# Patient Record
Sex: FEMALE | Race: WHITE | NOT HISPANIC OR LATINO | Employment: OTHER | URBAN - METROPOLITAN AREA
[De-identification: names, ages, dates, MRNs, and addresses within clinical notes are randomized per-mention and may not be internally consistent; named-entity substitution may affect disease eponyms.]

---

## 2018-08-31 ENCOUNTER — OFFICE VISIT CONVERTED (OUTPATIENT)
Dept: PULMONOLOGY | Facility: CLINIC | Age: 58
End: 2018-08-31
Attending: INTERNAL MEDICINE

## 2021-03-17 ENCOUNTER — HOSPITAL ENCOUNTER (OUTPATIENT)
Dept: VACCINE CLINIC | Facility: HOSPITAL | Age: 61
Discharge: HOME | End: 2021-03-17
Attending: INTERNAL MEDICINE

## 2021-04-07 ENCOUNTER — HOSPITAL ENCOUNTER (OUTPATIENT)
Dept: VACCINE CLINIC | Facility: HOSPITAL | Age: 61
Discharge: HOME | End: 2021-04-07
Attending: INTERNAL MEDICINE

## 2021-05-28 VITALS
OXYGEN SATURATION: 98 % | HEIGHT: 60 IN | WEIGHT: 198.56 LBS | SYSTOLIC BLOOD PRESSURE: 131 MMHG | BODY MASS INDEX: 38.98 KG/M2 | DIASTOLIC BLOOD PRESSURE: 73 MMHG | HEART RATE: 75 BPM | TEMPERATURE: 97.9 F | RESPIRATION RATE: 10 BRPM

## 2021-05-28 NOTE — PROGRESS NOTES
Patient: BE PLASCENCIA     Acct: [id number]     Report: #[id number]  UNIT #: [id number]     : 1960    Encounter Date:2018  PRIMARY CARE: DEJA Swartz  ***Signed***  --------------------------------------------------------------------------------------------------------------------  Chief Complaint      Encounter Date      Aug 31, 2018            Primary Care Provider            DEJA SWARTZ            Referring Provider            [facility] D/[facility]            Patient Complaint      Patient is complaining of      [facility] d/c f/u PE            VITALS      Height 5 ft 0 in / 152.4 cm      Weight 198 lbs 9 oz / 90.026627 kg      BSA 2.00 m2      BMI 38.8 kg/m2      Temperature 97.9 F / 36.61 C - Oral      Pulse 75      Respirations 10      Blood Pressure 131/73 Sitting, Left Arm      Pulse Oximetry 98%, ROOMAIR      Exhaled Nitrous Oxide Testin            HPI      The patient is a very pleasant 58 year old obese  female former     cigarette smoker with emphysema here to establish care.  In January she was     hospitalized with MRSA and strep pneumonia with pneumococcal bacteremia.  About     2-3 weeks ago she was hospitalized with hypoxic respiratory failure, shortness     of breath, cough and wheeze.  Chest x-ray showed granulomatous disease and     multifocal lung infiltrates.  All cultures were negative.  She completed a     course of ceftriaxone and azithromycin and was discharged home with albuterol.      During that time she was noted to have nocturnal desaturations and was told she     needs a sleep study. She feels much better today, but she uses albuterol 2-3     times a day with significant improvement in symptoms.  She has dyspnea on     exertion, worse with exertion, relieved with rest and moderate in severity.      Worse with walking about 7-800 feet. She has associated dry hacking cough, no     sputum production or hemoptysis. She also has wheezing on exertion.  Albuterol      completely resolves these symptoms, but then they occur 2-3 hours later.  She     has been told maybe she has COPD. She denies any weight gain, orthopnea,     paroxysmals nocturnal dyspnea, nausea, vomiting, fevers, chills or headaches.     She does snore at night and has trouble falling asleep at night and also has     trouble staying awake after meals or while watching TV and has a high [place]     sleepiness scale and is asking about a sleep study.  She needs to be vaccinated     for flu and pneumonia. She smoked for about 35 pack years and quit smoking about    15 years ago.            I have personally reviewed the review of systems, past family, social, surgical     and medical histories and I agree with the findings.            ROS      Constitutional:  Denies: Fatigue, Fever, Weight gain, Weight loss, Chills,     Insomnia, Other      Respiratory/Breathing:  Complains of: Shortness of air, Wheezing, Cough; Denies:    Hemoptysis, Pleuritic pain, Other      Endocrine:  Denies: Polydipsia, Polyuria, Heat/cold intolerance, Abnorml     menstrual pattern, Diabetes, Other      Eyes:  Denies: Blurred vision, Vision Changes, Other      Ears, nose, mouth, throat:  Denies: Mouth lesions, Thrush, Throat pain,     Hoarseness, Allergies/Hay Fever, Post Nasal Drip, Headaches, Recent Head Injury,    Nose Bleeding, Neck Stiffness, Thyroid Mass, Hearing Loss, Ear Fullness, Dry     Mouth, Nasal or Sinus Pain, Dry Lips, Nasal discharge, Nasal congestion, Other      Cardiovascular:  Denies: Palpitations, Syncope, Claudication, Chest Pain, Wake     up Gasping for air, Leg Swelling, Irregular Heart Rate, Cyanosis, Dyspnea on     Exertion, Other      Gastrointestinal:  Denies: Nausea, Constipation, Diarrhea, Abdominal pain,     Vomiting, Difficulty Swallowing, Reflux/Heartburn, Dysphagia, Jaundice,     Bloating, Melena, Bloody stools, Other      Genitourinary:  Denies: Urinary frequency, Incontinence, Hematuria, Urgency,      Nocturia, Dysuria, Testicular problems, Other      Musculoskeletal:  Denies: Joint Pain, Joint Stiffness, Joint Swelling, Myalgias,    Other      Hematologic/lymphatic:  DENIES: Lymphadenopathy, Bruising, Bleeding tendencies,     Other      Neurological:  Denies: Headache, Numbness, Weakness, Seizures, Other      Psychiatric:  Denies: Anxiety, Appropriate Effect, Depression, Other      Sleep:  No: Excessive daytime sleep, Morning Headache?, Snoring, Insomnia?, Stop    breathing at sleep?, Other      Integumentary:  Denies: Rash, Dry skin, Skin Warm to Touch, Other      Immunologic/Allergic:  Denies: Latex allergy, Seasonal allergies, Asthma,     Urticaria, Eczema, Other      Immunization status:  No: Up to date            FAMILY/SOCIAL/MEDICAL HX      Surgical History:  Yes: Abdominal Surgery (hernia), Appendectomy,     Cholecystectomy; No: Bladder Surgery, Bowel Surgery, CABG, Head Surgery, Oral     Surgery, Orthopedic Surgery, Vascular Surgery      Stroke - Family Hx:  Grandparent      Diabetes - Family Hx:  Grandparent      Cancer/Type - Family Hx:  Sister      Is Father Still Living?:  Yes      Is Mother Still Living?:  No       Family History:  Yes      Social History:  No Tobacco Use, No Alcohol Use, No Recreational Drug use      Smoking status:  Former smoker (2 PPD X 30 Y QUIT 7/2009)      Anticoagulation Therapy:  Yes      Antibiotic Prophylaxis:  No      Medical History:  Yes: Arthritis (left hip), Heart Attack (2013),     Hemorrhoids/Rectal Prob (IBS has chronic diarrhea cbrn), High Blood Pressure;     No: Asthma, Blood Disease, Chemotherapy/Cancer, Chronic Bronchitis/COPD,     Congestive Heart Failu, Deafness or Ringing Ears, Diabetes, Seizures, Shortness     Of Breath, Miscellaneous Medical/oth      Psychiatric History      NONE            PREVENTION      Hx Influenza Vaccination:  Yes (10/2017)      Date Influenza Vaccine Given:  Sep 1, 2017      Influenza Vaccine Declined:  No      2 or More Falls  Past Year?:  No      Fall Past Year with Injury?:  No      Hx Pneumococcal Vaccination:  Yes      Encouraged to follow-up with:  PCP regarding preventative exams.      Chart initiated by      VELMA GAMBLE            ALLERGIES/MEDICATIONS      Allergies:        Coded Allergies:             DULOXETINE (Verified  Allergy, Intermediate, rash, 8/31/18)           NSAIDS (NON-STEROIDAL ANTI-INFLAMMA (Verified  Adverse Reaction, Mild,     upset stomach, 8/31/18)      Uncoded Allergies:             nebutal (Adverse Reaction, Mild, DIARRHEA, 8/19/18)      Medications    Last Reconciled on 8/31/18 14:20 by JOSE NULL MD      Budesonide/Formoterol Fumarate (Symbicort 160/4.5 Mcg) 10.2 Gm Inh      2 PUFF INH BID, #1 INH 5 Refills         Prov: JOSE NULL         8/31/18       MDI-Albuterol (Ventolin HFA*) 18 Gm Hfa.aer.ad      1 PUFFS INH Q6H PRN for SHORTNESS OF BREATH, #1 MDI 0 Refills         Prov: Arianna Godinez         8/23/18       Aspirin EC (Aspirin EC*) 81 Mg Tablet.dr      81 MG PO QDAY for 30 Days, #30 TAB.SR         Prov: Arianna Godinez         8/23/18       Atorvastatin (Atorvastatin) 20 Mg Tablet      20 MG PO HS for 30 Days, #30 TAB         Prov: Arianna Godinez         8/23/18       Cyclobenzaprine Hcl (Cyclobenzaprine*) 10 Mg Tablet      10 MG PO Q8H PRN for MUSCLE SPASMS for 10 Days, #30 TAB         Prov: Arianna Godinez         8/23/18       Ranitidine HCl (Ranitidine*) 300 Mg Tablet      300 MG PO QDAY, #30 TAB         Reported         1/18/18       Metoprolol Succinate (Toprol XL*) 50 Mg Tabcr      100 MG PO QDAY, #30 TAB 0 Refills         Reported         5/8/16       Paroxetine Hcl (Paxil*) 40 Mg Tablet      40 MG PO QDAY, TAB         Reported         1/19/16       Benazepril/HCTZ 20/12.5 MG (Benazepril/HCTZ 20/12.5 Mg) 1 Tab Tablet      1 TAB PO QDAY, #60 TAB         Reported         1/19/16      Current Medications      Current Medications Reviewed 8/31/18            EXAM      Vital Signs  Reviewed.      General:  WDWN, Alert, NAD.      HEENT: PERRL, EOMI.  OP, nares clear, no sinus tenderness.      Neck: Supple, no JVD, no thyromegaly.      Lymph: No axillary, cervical, supraclavicular lymphadenopathy noted bilaterally.      Chest: Good aeration, clear to auscultation bilaterally, tympanic to percussion     bilaterally, no work of breathing noted.      CV: RRR, no MGR, pulses 2+, equal.        Abd: Obese, soft, NT, ND, +BS, no HSM.      EXT: No clubbing, no cyanosis, no edema, no joint tenderness.        Neuro:  A  Skin: No rashes or lesions.      Vtials      Vitals:             Height 5 ft 0 in / 152.4 cm           Weight 198 lbs 9 oz / 90.361621 kg           BSA 2.00 m2           BMI 38.8 kg/m2           Temperature 97.9 F / 36.61 C - Oral           Pulse 75           Respirations 10           Blood Pressure 131/73 Sitting, Left Arm           Pulse Oximetry 98%, ROOMAIR            REVIEW      Results Reviewed      PCCS Results Reviewed?:  Yes Prev Lab Results, Yes Prev Radiology Results, Yes     [facility]      Lab Results      I personally reviewed office notes from referring provider. I personally     reviewed labs including a CBC from 08/2018 showing a white cough and no     peripheral eosinophilia.  I also personally reviewed renal panel showing an     elevated serum bicarbonate, however I did review blood gas from 08/2018 showing     no evidence of chronic hypercapnic respiratory failure, but acute hypoxemic     respiratory failure as it read 7.46/40/63/28 on 30% FIO2.  Microbiology from     that admission was reviewed and unremarkable. I reviewed a chest x-ray and a     chest CT both from 08/2018.  I also reviewed echocardiogram from 01/2018 showing    evidence of diastolic dysfunction.      Radiographic Results               [facility]                PACS RADIOLOGY REPORT            Patient: BE PLASCENCIA   Acct:  #[id number]   Report: #[id number]            UNIT #: [id number]    DOS: 18 1630      INSURANCE:BLUE CROSS - [employer]   ORDER #:[id number]      LOCATION:[facility]  [address]   : 1960            PROVIDERS      ADMITTING:  Arianna Godinez   ATTENDING: Arianna Godinez      FAMILY:  DEJA Soliman   ORDERING:  Arianna Godinez         OTHER:    DICTATING:  Reuben Rosenberg MD, IV            REQ #:[id number]   EXAM:[facility] - CT CHEST with CONTRAST      REASON FOR EXAM:  chest pain, rule out PE      REASON FOR VISIT:  COPD            *******Signed******         PROCEDURE:   CT CHEST W/ CONTRAST             COMPARISON:   [facility], CT, CHEST W/ CONTRAST, 2018, 20:07.             INDICATIONS:   CHEST PAIN, SHORTNESS OF AIR             PROTOCOL:     Pulmonary embolism imaging protocol performed                RADIATION:     DLP: 594mGy*cm          Automated exposure control was utilized to minimize radiation dose.              CONTRAST:   75cc Isovue 370 I.V.             LABS:     eGFR: >60ml/min/1.73m2             TECHNIQUE:   Axial images of the chest with intravenous contrast.             FINDINGS:      No pulmonary emboli are identified.  Mild Coronary artery calcification is pr    esent.  The thoracic       aorta has a normal caliber.  No evidence of pneumothorax or pleural effusion.      There are mild       patchy areas of ground-glass opacity in the lung fields.  No discrete masses are    identified.  There       are no enlarged mediastinal, axillary or hilar lymph nodes.  The patient is     status post       cholecystectomy.  There is mild aortic valvular calcification.             IMPRESSION:              1.  No pulmonary emboli are identified.             2.  Mild coronary artery calcification and aortic valvular calcification.             3.  Mild patchy areas of of ground-glass opacity in the lung fields.  This may     be secondary to mild       edema or nonspecific pneumonitis.              REUBEN  CURLY CORONADO MD             Electronically Signed and Approved By: MAHNAZ CORONADO MD on 2018 at 20:13                           Until signed, this is an unconfirmed preliminary report that may contain      errors and is subject to change.                                              CODIE:      D:18               [facility]                PACS RADIOLOGY REPORT            Patient: BE PLASCENCIA   Acct: #[id number]   Report: #[id number]            UNIT #: [id number]    DOS: 18 0935      INSURANCE:[facility]   ORDER #:[id number]      LOCATION:[facility]  [address]   : 1960            PROVIDERS      ADMITTING:  Arianna Godinez   ATTENDING: Arianna Godinez      FAMILY:  DEJA Soliman   ORDERING:  Arianna Godinez         OTHER:    DICTATING:  VERNON HONEYCUTT MD            REQ #:[id number]   EXAM:CXRPORTABL - Portable Chest xray 1 view      REASON FOR EXAM:  CHEST PAIN      REASON FOR VISIT:  COPD            *******Signed******         PROCEDURE:   PORTABLE CHEST X-RAY             COMPARISON:   [facility], CR, CHEST AP/PA 1 VIEW, 2018,     19:24.             INDICATIONS:   CHEST PAIN             FINDINGS:         Stable mild cardiomegaly.  No dense consolidation, pleural fluid, or     pneumothorax.             CONCLUSION:   Stable cardiomegaly.  No active process              VERNON HONEYCUTT MD             Electronically Signed and Approved By: VERNON HONEYCUTT MD on 2018 at 10:15                        Until signed, this is an unconfirmed preliminary report that may contain      errors and is subject to change.                                              SARA:      D:18 1015            Assessment      Obesity (BMI 30-39.9) - E66.9            Snoring - R06.83            Excessive daytime sleepiness - G47.19            SEN (dyspnea on exertion) - R06.09            Cough - R05            Ground glass opacity present  on imaging of lung - R91.8            Notes      New Medications      * Budesonide/Formoterol Fumarate (Symbicort 160/4.5 Mcg) 10.2 GM INH: 2 PUFF INH      BID #1         Dx: Cough - R05      New Diagnostics      * Home Sleep Study, Week         Dx: Obesity (BMI 30-39.9) - E66.9      * 6 Min Walk With Pulse Ox, Routine         Dx: Obesity (BMI 30-39.9) - E66.9      * PFT-Comp, PrePost,DLCO,BodyBox, Week         Dx: Obesity (BMI 30-39.9) - E66.9      * Alpha 1 Antitrypsin , Month         Dx: Obesity (BMI 30-39.9) - E66.9      * Chest W/O Cont CT, 2 Months         Dx: Obesity (BMI 30-39.9) - E66.9      New Office Procedures      * Prevnar 0.5 PCV13, As Soon As Possible         Pneumoc 13-Gisela Conj-Dip CRm/Pf (Prevnar 13 Syringe) 0.5 ML SYRINGE: 0.5        MILLILITER INTRAMUSCULARLY Qty 1 SYRINGE         Dx: Cough - R05      IMPRESSION:      1.  Acute hypoxemic respiratory failure, resolved.      2. Community acquired pneumonia from unspecified organism, resolved.      3.  Normal chest CT with multifocal ground glass infiltrates, needs to be     followed up to ensure resolution.      4. Dyspnea on exertion at baseline.      5. Cough.      6. Obesity with BMI of 38.8.      7. Emphysema.  Needs further workup for COPD and would benefit from controller     inhaler medications given persistent symptoms at baseline.      8.  Snoring.      9.  Excessive daytime sleepiness.      10. Tobacco abuse with cigarettes in remission.               PLAN:      1.  I performed exhale nitric oxide testing in the office today.  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       2.  We will check noncontrast chest CT in two months to assess for resolution of    multifocal lung infiltrates and ground glass infiltrates.      3. Check alpha 1 antitrypsin level and genotype.      4. Check PFTs and six minute walk test to assess for airflow obstruction and     bronchodilator response.      5. Check home  sleep study and start autotitrating CPAP as she does have sleep     apnea.      6.  Start Symbicort 160/4.5 two puffs twice a day. Continue albuterol as needed.      7.  She needs to be vaccinated for flu and pneumonia.  We will do Prevnar today     and Pneumovax in 08/2019.  She needs flu vaccine at her next follow up visit.      8.  Diet and exercise counseling provided today.  I spent 4 minutes discussing     30-60 minutes of daily exercise and a 0288-5515 calorie a day diet.  She     verbalized understanding.        9.  We will have patient follow up in 2-3 months to reassess symptoms and     discuss test results.            Patient Education      ACO BMI High above 25:  Counseling Given, Encouraged weight loss, Encourage     dietary changes      Patient Education Provided:  How to use an Inhaler, Sleep Apnea            Patient Education:        Emphysema                 Disclaimer: Converted document may not contain table formatting or lab diagrams. Please see [employer] [facility] for the authenticated document.

## 2021-08-30 ENCOUNTER — APPOINTMENT (OUTPATIENT)
Dept: GENERAL RADIOLOGY | Facility: HOSPITAL | Age: 61
End: 2021-08-30

## 2021-08-30 ENCOUNTER — HOSPITAL ENCOUNTER (EMERGENCY)
Facility: HOSPITAL | Age: 61
Discharge: HOME | End: 2021-08-31
Attending: EMERGENCY MEDICINE | Admitting: EMERGENCY MEDICINE

## 2021-08-30 DIAGNOSIS — R19.7 DIARRHEA, UNSPECIFIED TYPE: ICD-10-CM

## 2021-08-30 DIAGNOSIS — R06.00 DYSPNEA, UNSPECIFIED TYPE: Primary | ICD-10-CM

## 2021-08-30 LAB
ALBUMIN SERPL-MCNC: 4.1 G/DL (ref 3.5–5.2)
ALBUMIN/GLOB SERPL: 1.3 G/DL
ALP SERPL-CCNC: 143 U/L (ref 39–117)
ALT SERPL W P-5'-P-CCNC: 25 U/L (ref 1–33)
ANION GAP SERPL CALCULATED.3IONS-SCNC: 14.8 MMOL/L (ref 5–15)
AST SERPL-CCNC: 18 U/L (ref 1–32)
BASOPHILS # BLD AUTO: 0.06 10*3/MM3 (ref 0–0.2)
BASOPHILS NFR BLD AUTO: 0.8 % (ref 0–1.5)
BILIRUB SERPL-MCNC: 0.7 MG/DL (ref 0–1.2)
BUN SERPL-MCNC: 15 MG/DL (ref 8–23)
BUN/CREAT SERPL: 19.2 (ref 7–25)
CALCIUM SPEC-SCNC: 9.3 MG/DL (ref 8.6–10.5)
CHLORIDE SERPL-SCNC: 97 MMOL/L (ref 98–107)
CO2 SERPL-SCNC: 25.2 MMOL/L (ref 22–29)
CREAT SERPL-MCNC: 0.78 MG/DL (ref 0.57–1)
DEPRECATED RDW RBC AUTO: 44.3 FL (ref 37–54)
EOSINOPHIL # BLD AUTO: 0.07 10*3/MM3 (ref 0–0.4)
EOSINOPHIL NFR BLD AUTO: 0.9 % (ref 0.3–6.2)
ERYTHROCYTE [DISTWIDTH] IN BLOOD BY AUTOMATED COUNT: 14.6 % (ref 12.3–15.4)
GFR SERPL CREATININE-BSD FRML MDRD: 75 ML/MIN/1.73
GLOBULIN UR ELPH-MCNC: 3.1 GM/DL
GLUCOSE SERPL-MCNC: 149 MG/DL (ref 65–99)
HCT VFR BLD AUTO: 46 % (ref 34–46.6)
HGB BLD-MCNC: 15.8 G/DL (ref 12–15.9)
HOLD SPECIMEN: NORMAL
HOLD SPECIMEN: NORMAL
IMM GRANULOCYTES # BLD AUTO: 0.02 10*3/MM3 (ref 0–0.05)
IMM GRANULOCYTES NFR BLD AUTO: 0.3 % (ref 0–0.5)
LYMPHOCYTES # BLD AUTO: 1.58 10*3/MM3 (ref 0.7–3.1)
LYMPHOCYTES NFR BLD AUTO: 21.2 % (ref 19.6–45.3)
MAGNESIUM SERPL-MCNC: 1.7 MG/DL (ref 1.6–2.4)
MCH RBC QN AUTO: 29.2 PG (ref 26.6–33)
MCHC RBC AUTO-ENTMCNC: 34.3 G/DL (ref 31.5–35.7)
MCV RBC AUTO: 84.9 FL (ref 79–97)
MONOCYTES # BLD AUTO: 0.4 10*3/MM3 (ref 0.1–0.9)
MONOCYTES NFR BLD AUTO: 5.4 % (ref 5–12)
NEUTROPHILS NFR BLD AUTO: 5.32 10*3/MM3 (ref 1.7–7)
NEUTROPHILS NFR BLD AUTO: 71.4 % (ref 42.7–76)
NRBC BLD AUTO-RTO: 0 /100 WBC (ref 0–0.2)
NT-PROBNP SERPL-MCNC: 589 PG/ML (ref 0–900)
PLATELET # BLD AUTO: 200 10*3/MM3 (ref 140–450)
PMV BLD AUTO: 9.9 FL (ref 6–12)
POTASSIUM SERPL-SCNC: 3.4 MMOL/L (ref 3.5–5.2)
PROT SERPL-MCNC: 7.2 G/DL (ref 6–8.5)
RBC # BLD AUTO: 5.42 10*6/MM3 (ref 3.77–5.28)
SODIUM SERPL-SCNC: 137 MMOL/L (ref 136–145)
TROPONIN T SERPL-MCNC: <0.01 NG/ML (ref 0–0.03)
WBC # BLD AUTO: 7.45 10*3/MM3 (ref 3.4–10.8)
WHOLE BLOOD HOLD SPECIMEN: NORMAL
WHOLE BLOOD HOLD SPECIMEN: NORMAL

## 2021-08-30 PROCEDURE — 93010 ELECTROCARDIOGRAM REPORT: CPT | Performed by: INTERNAL MEDICINE

## 2021-08-30 PROCEDURE — U0003 INFECTIOUS AGENT DETECTION BY NUCLEIC ACID (DNA OR RNA); SEVERE ACUTE RESPIRATORY SYNDROME CORONAVIRUS 2 (SARS-COV-2) (CORONAVIRUS DISEASE [COVID-19]), AMPLIFIED PROBE TECHNIQUE, MAKING USE OF HIGH THROUGHPUT TECHNOLOGIES AS DESCRIBED BY CMS-2020-01-R: HCPCS | Performed by: NURSE PRACTITIONER

## 2021-08-30 PROCEDURE — 85025 COMPLETE CBC W/AUTO DIFF WBC: CPT | Performed by: EMERGENCY MEDICINE

## 2021-08-30 PROCEDURE — 93005 ELECTROCARDIOGRAM TRACING: CPT | Performed by: EMERGENCY MEDICINE

## 2021-08-30 PROCEDURE — 96374 THER/PROPH/DIAG INJ IV PUSH: CPT

## 2021-08-30 PROCEDURE — 25010000002 ONDANSETRON PER 1 MG: Performed by: EMERGENCY MEDICINE

## 2021-08-30 PROCEDURE — 83735 ASSAY OF MAGNESIUM: CPT | Performed by: EMERGENCY MEDICINE

## 2021-08-30 PROCEDURE — 71045 X-RAY EXAM CHEST 1 VIEW: CPT

## 2021-08-30 PROCEDURE — 84484 ASSAY OF TROPONIN QUANT: CPT | Performed by: EMERGENCY MEDICINE

## 2021-08-30 PROCEDURE — 93005 ELECTROCARDIOGRAM TRACING: CPT

## 2021-08-30 PROCEDURE — 80053 COMPREHEN METABOLIC PANEL: CPT | Performed by: EMERGENCY MEDICINE

## 2021-08-30 PROCEDURE — 99283 EMERGENCY DEPT VISIT LOW MDM: CPT

## 2021-08-30 PROCEDURE — 83880 ASSAY OF NATRIURETIC PEPTIDE: CPT | Performed by: EMERGENCY MEDICINE

## 2021-08-30 RX ORDER — ONDANSETRON 2 MG/ML
4 INJECTION INTRAMUSCULAR; INTRAVENOUS ONCE
Status: COMPLETED | OUTPATIENT
Start: 2021-08-30 | End: 2021-08-30

## 2021-08-30 RX ORDER — IPRATROPIUM BROMIDE AND ALBUTEROL SULFATE 2.5; .5 MG/3ML; MG/3ML
3 SOLUTION RESPIRATORY (INHALATION) ONCE
Status: DISCONTINUED | OUTPATIENT
Start: 2021-08-30 | End: 2021-08-31 | Stop reason: HOSPADM

## 2021-08-30 RX ORDER — SODIUM CHLORIDE 0.9 % (FLUSH) 0.9 %
10 SYRINGE (ML) INJECTION AS NEEDED
Status: DISCONTINUED | OUTPATIENT
Start: 2021-08-30 | End: 2021-08-31 | Stop reason: HOSPADM

## 2021-08-30 RX ADMIN — SODIUM CHLORIDE 1000 ML: 9 INJECTION, SOLUTION INTRAVENOUS at 23:08

## 2021-08-30 RX ADMIN — ONDANSETRON 4 MG: 2 INJECTION INTRAMUSCULAR; INTRAVENOUS at 23:07

## 2021-08-31 VITALS
WEIGHT: 212.3 LBS | SYSTOLIC BLOOD PRESSURE: 140 MMHG | RESPIRATION RATE: 20 BRPM | HEART RATE: 72 BPM | OXYGEN SATURATION: 96 % | TEMPERATURE: 97.9 F | HEIGHT: 61 IN | BODY MASS INDEX: 40.08 KG/M2 | DIASTOLIC BLOOD PRESSURE: 75 MMHG

## 2021-08-31 LAB — SARS-COV-2 RNA RESP QL NAA+PROBE: NOT DETECTED

## 2021-08-31 RX ORDER — ONDANSETRON 4 MG/1
4 TABLET, ORALLY DISINTEGRATING ORAL EVERY 8 HOURS PRN
Qty: 12 TABLET | Refills: 0 | Status: SHIPPED | OUTPATIENT
Start: 2021-08-31

## 2021-09-07 LAB — QT INTERVAL: 394 MS

## 2022-04-19 ENCOUNTER — APPOINTMENT (OUTPATIENT)
Dept: GENERAL RADIOLOGY | Facility: HOSPITAL | Age: 62
End: 2022-04-19

## 2022-04-19 ENCOUNTER — HOSPITAL ENCOUNTER (EMERGENCY)
Facility: HOSPITAL | Age: 62
Discharge: HOME | End: 2022-04-19
Attending: EMERGENCY MEDICINE | Admitting: EMERGENCY MEDICINE

## 2022-04-19 VITALS
SYSTOLIC BLOOD PRESSURE: 113 MMHG | HEIGHT: 61 IN | DIASTOLIC BLOOD PRESSURE: 52 MMHG | HEART RATE: 57 BPM | BODY MASS INDEX: 40.11 KG/M2 | OXYGEN SATURATION: 95 % | TEMPERATURE: 99.4 F | RESPIRATION RATE: 16 BRPM
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
## 2022-04-19 DIAGNOSIS — M54.9 BACK PAIN, UNSPECIFIED BACK LOCATION, UNSPECIFIED BACK PAIN LATERALITY, UNSPECIFIED CHRONICITY: ICD-10-CM

## 2022-04-19 PROCEDURE — 96372 THER/PROPH/DIAG INJ SC/IM: CPT

## 2022-04-19 PROCEDURE — 25010000002 KETOROLAC TROMETHAMINE PER 15 MG: Performed by: EMERGENCY MEDICINE

## 2022-04-19 PROCEDURE — 96374 THER/PROPH/DIAG INJ IV PUSH: CPT

## 2022-04-19 PROCEDURE — 99283 EMERGENCY DEPT VISIT LOW MDM: CPT

## 2022-04-19 PROCEDURE — 72100 X-RAY EXAM L-S SPINE 2/3 VWS: CPT

## 2022-04-19 PROCEDURE — 71045 X-RAY EXAM CHEST 1 VIEW: CPT

## 2022-04-19 PROCEDURE — 25010000002 ORPHENADRINE CITRATE PER 60 MG: Performed by: EMERGENCY MEDICINE

## 2022-04-19 RX ORDER — KETOROLAC TROMETHAMINE 15 MG/ML
15 INJECTION, SOLUTION INTRAMUSCULAR; INTRAVENOUS ONCE
Status: COMPLETED | OUTPATIENT
Start: 2022-04-19 | End: 2022-04-19

## 2022-04-19 RX ORDER — KETOROLAC TROMETHAMINE 10 MG/1
10 TABLET, FILM COATED ORAL EVERY 6 HOURS PRN
Qty: 15 TABLET | Refills: 0 | Status: SHIPPED | OUTPATIENT
Start: 2022-04-19

## 2022-04-19 RX ORDER — ORPHENADRINE CITRATE 30 MG/ML
60 INJECTION INTRAMUSCULAR; INTRAVENOUS ONCE
Status: COMPLETED | OUTPATIENT
Start: 2022-04-19 | End: 2022-04-19

## 2022-04-19 RX ORDER — OXYCODONE HYDROCHLORIDE AND ACETAMINOPHEN 5; 325 MG/1; MG/1
1 TABLET ORAL ONCE
Status: COMPLETED | OUTPATIENT
Start: 2022-04-19 | End: 2022-04-19

## 2022-04-19 RX ORDER — CYCLOBENZAPRINE HCL 10 MG
10 TABLET ORAL 3 TIMES DAILY
Qty: 20 TABLET | Refills: 0 | Status: SHIPPED | OUTPATIENT
Start: 2022-04-19

## 2022-04-19 RX ADMIN — OXYCODONE HYDROCHLORIDE AND ACETAMINOPHEN 1 TABLET: 5; 325 TABLET ORAL at 20:39

## 2022-04-19 RX ADMIN — KETOROLAC TROMETHAMINE 15 MG: 15 INJECTION, SOLUTION INTRAMUSCULAR; INTRAVENOUS at 20:40

## 2022-04-19 RX ADMIN — ORPHENADRINE CITRATE 60 MG: 60 INJECTION INTRAMUSCULAR; INTRAVENOUS at 20:41

## 2023-05-30 ENCOUNTER — HOSPITAL ENCOUNTER (EMERGENCY)
Facility: HOSPITAL | Age: 63
Discharge: HOME | End: 2023-05-31
Attending: EMERGENCY MEDICINE | Admitting: EMERGENCY MEDICINE
Payer: MEDICARE

## 2023-05-30 ENCOUNTER — APPOINTMENT (OUTPATIENT)
Dept: GENERAL RADIOLOGY | Facility: HOSPITAL | Age: 63
End: 2023-05-30
Payer: MEDICARE

## 2023-05-30 VITALS
WEIGHT: 203.93 LBS | HEIGHT: 61 IN | DIASTOLIC BLOOD PRESSURE: 76 MMHG | SYSTOLIC BLOOD PRESSURE: 155 MMHG | TEMPERATURE: 98.5 F | BODY MASS INDEX: 38.5 KG/M2 | HEART RATE: 56 BPM | OXYGEN SATURATION: 93 % | RESPIRATION RATE: 18 BRPM

## 2023-05-30 DIAGNOSIS — I10 HYPERTENSION, UNSPECIFIED TYPE: Primary | ICD-10-CM

## 2023-05-30 DIAGNOSIS — R51.9 NONINTRACTABLE HEADACHE, UNSPECIFIED CHRONICITY PATTERN, UNSPECIFIED HEADACHE TYPE: ICD-10-CM

## 2023-05-30 LAB
ALBUMIN SERPL-MCNC: 4.1 G/DL (ref 3.5–5.2)
ALBUMIN/GLOB SERPL: 1.2 G/DL
ALP SERPL-CCNC: 118 U/L (ref 39–117)
ALT SERPL W P-5'-P-CCNC: 8 U/L (ref 1–33)
ANION GAP SERPL CALCULATED.3IONS-SCNC: 9.3 MMOL/L (ref 5–15)
AST SERPL-CCNC: 12 U/L (ref 1–32)
BACTERIA UR QL AUTO: ABNORMAL /HPF
BASOPHILS # BLD AUTO: 0.07 10*3/MM3 (ref 0–0.2)
BASOPHILS NFR BLD AUTO: 0.7 % (ref 0–1.5)
BILIRUB SERPL-MCNC: 0.4 MG/DL (ref 0–1.2)
BILIRUB UR QL STRIP: NEGATIVE
BUN SERPL-MCNC: 17 MG/DL (ref 8–23)
BUN/CREAT SERPL: 17.3 (ref 7–25)
CALCIUM SPEC-SCNC: 9.8 MG/DL (ref 8.6–10.5)
CHLORIDE SERPL-SCNC: 101 MMOL/L (ref 98–107)
CLARITY UR: ABNORMAL
CO2 SERPL-SCNC: 31.7 MMOL/L (ref 22–29)
COLOR UR: YELLOW
CREAT SERPL-MCNC: 0.98 MG/DL (ref 0.57–1)
DEPRECATED RDW RBC AUTO: 47.8 FL (ref 37–54)
EGFRCR SERPLBLD CKD-EPI 2021: 64.2 ML/MIN/1.73
EOSINOPHIL # BLD AUTO: 0.18 10*3/MM3 (ref 0–0.4)
EOSINOPHIL NFR BLD AUTO: 1.9 % (ref 0.3–6.2)
ERYTHROCYTE [DISTWIDTH] IN BLOOD BY AUTOMATED COUNT: 14.5 % (ref 12.3–15.4)
GLOBULIN UR ELPH-MCNC: 3.4 GM/DL
GLUCOSE SERPL-MCNC: 82 MG/DL (ref 65–99)
GLUCOSE UR STRIP-MCNC: NEGATIVE MG/DL
HCT VFR BLD AUTO: 45.9 % (ref 34–46.6)
HGB BLD-MCNC: 15.1 G/DL (ref 12–15.9)
HGB UR QL STRIP.AUTO: ABNORMAL
HOLD SPECIMEN: NORMAL
HOLD SPECIMEN: NORMAL
HYALINE CASTS UR QL AUTO: ABNORMAL /LPF
IMM GRANULOCYTES # BLD AUTO: 0.03 10*3/MM3 (ref 0–0.05)
IMM GRANULOCYTES NFR BLD AUTO: 0.3 % (ref 0–0.5)
KETONES UR QL STRIP: ABNORMAL
LEUKOCYTE ESTERASE UR QL STRIP.AUTO: ABNORMAL
LYMPHOCYTES # BLD AUTO: 2.5 10*3/MM3 (ref 0.7–3.1)
LYMPHOCYTES NFR BLD AUTO: 26 % (ref 19.6–45.3)
MCH RBC QN AUTO: 29.4 PG (ref 26.6–33)
MCHC RBC AUTO-ENTMCNC: 32.9 G/DL (ref 31.5–35.7)
MCV RBC AUTO: 89.3 FL (ref 79–97)
MONOCYTES # BLD AUTO: 0.46 10*3/MM3 (ref 0.1–0.9)
MONOCYTES NFR BLD AUTO: 4.8 % (ref 5–12)
NEUTROPHILS NFR BLD AUTO: 6.36 10*3/MM3 (ref 1.7–7)
NEUTROPHILS NFR BLD AUTO: 66.3 % (ref 42.7–76)
NITRITE UR QL STRIP: NEGATIVE
NRBC BLD AUTO-RTO: 0 /100 WBC (ref 0–0.2)
NT-PROBNP SERPL-MCNC: 1151 PG/ML (ref 0–900)
PH UR STRIP.AUTO: 5.5 [PH] (ref 5–8)
PLATELET # BLD AUTO: 240 10*3/MM3 (ref 140–450)
PMV BLD AUTO: 9.8 FL (ref 6–12)
POTASSIUM SERPL-SCNC: 3.9 MMOL/L (ref 3.5–5.2)
PROT SERPL-MCNC: 7.5 G/DL (ref 6–8.5)
PROT UR QL STRIP: ABNORMAL
RBC # BLD AUTO: 5.14 10*6/MM3 (ref 3.77–5.28)
RBC # UR STRIP: ABNORMAL /HPF
REF LAB TEST METHOD: ABNORMAL
SODIUM SERPL-SCNC: 142 MMOL/L (ref 136–145)
SP GR UR STRIP: 1.03 (ref 1–1.03)
SQUAMOUS #/AREA URNS HPF: ABNORMAL /HPF
TROPONIN T SERPL HS-MCNC: 10 NG/L
TROPONIN T SERPL HS-MCNC: 11 NG/L
UROBILINOGEN UR QL STRIP: ABNORMAL
WBC # UR STRIP: ABNORMAL /HPF
WBC NRBC COR # BLD: 9.6 10*3/MM3 (ref 3.4–10.8)
WHOLE BLOOD HOLD COAG: NORMAL
WHOLE BLOOD HOLD SPECIMEN: NORMAL

## 2023-05-30 PROCEDURE — 83880 ASSAY OF NATRIURETIC PEPTIDE: CPT | Performed by: EMERGENCY MEDICINE

## 2023-05-30 PROCEDURE — 85025 COMPLETE CBC W/AUTO DIFF WBC: CPT | Performed by: EMERGENCY MEDICINE

## 2023-05-30 PROCEDURE — 84484 ASSAY OF TROPONIN QUANT: CPT | Performed by: EMERGENCY MEDICINE

## 2023-05-30 PROCEDURE — 93005 ELECTROCARDIOGRAM TRACING: CPT | Performed by: EMERGENCY MEDICINE

## 2023-05-30 PROCEDURE — 93005 ELECTROCARDIOGRAM TRACING: CPT

## 2023-05-30 PROCEDURE — 71045 X-RAY EXAM CHEST 1 VIEW: CPT

## 2023-05-30 PROCEDURE — 81001 URINALYSIS AUTO W/SCOPE: CPT

## 2023-05-30 PROCEDURE — 87086 URINE CULTURE/COLONY COUNT: CPT | Performed by: EMERGENCY MEDICINE

## 2023-05-30 PROCEDURE — 80053 COMPREHEN METABOLIC PANEL: CPT | Performed by: EMERGENCY MEDICINE

## 2023-05-30 PROCEDURE — 99284 EMERGENCY DEPT VISIT MOD MDM: CPT

## 2023-05-30 PROCEDURE — 36415 COLL VENOUS BLD VENIPUNCTURE: CPT | Performed by: EMERGENCY MEDICINE

## 2023-05-30 RX ORDER — ATORVASTATIN CALCIUM 20 MG/1
TABLET, FILM COATED ORAL
COMMUNITY

## 2023-05-30 RX ORDER — PAROXETINE HYDROCHLORIDE 20 MG/1
20 TABLET, FILM COATED ORAL EVERY MORNING
COMMUNITY
Start: 2023-04-23

## 2023-05-30 RX ORDER — LOSARTAN POTASSIUM 100 MG/1
1 TABLET ORAL DAILY
COMMUNITY
Start: 2023-04-23

## 2023-05-30 RX ORDER — BUPRENORPHINE 7.5 UG/H
PATCH TRANSDERMAL
COMMUNITY
Start: 2023-05-25

## 2023-05-30 RX ORDER — SODIUM CHLORIDE 0.9 % (FLUSH) 0.9 %
10 SYRINGE (ML) INJECTION AS NEEDED
Status: DISCONTINUED | OUTPATIENT
Start: 2023-05-30 | End: 2023-05-31 | Stop reason: HOSPADM

## 2023-05-30 RX ORDER — ALBUTEROL SULFATE 90 UG/1
2 AEROSOL, METERED RESPIRATORY (INHALATION) EVERY 4 HOURS PRN
COMMUNITY
Start: 2023-05-02

## 2023-05-30 RX ORDER — BUTALBITAL, ACETAMINOPHEN AND CAFFEINE 300; 40; 50 MG/1; MG/1; MG/1
1 CAPSULE ORAL ONCE
Status: COMPLETED | OUTPATIENT
Start: 2023-05-31 | End: 2023-05-30

## 2023-05-30 RX ORDER — ASPIRIN 81 MG/1
TABLET ORAL
COMMUNITY

## 2023-05-30 RX ORDER — FLUTICASONE FUROATE AND VILANTEROL 200; 25 UG/1; UG/1
1 POWDER RESPIRATORY (INHALATION) DAILY
COMMUNITY
Start: 2023-05-03

## 2023-05-30 RX ORDER — TIOTROPIUM BROMIDE 18 UG/1
CAPSULE ORAL; RESPIRATORY (INHALATION)
COMMUNITY
Start: 2023-05-24

## 2023-05-30 RX ORDER — AMLODIPINE BESYLATE 5 MG/1
1 TABLET ORAL DAILY
COMMUNITY
Start: 2023-04-23

## 2023-05-30 RX ORDER — TRIAMCINOLONE ACETONIDE 5 MG/G
CREAM TOPICAL SEE ADMIN INSTRUCTIONS
COMMUNITY
Start: 2023-05-02

## 2023-05-30 RX ORDER — METOPROLOL SUCCINATE 100 MG/1
100 TABLET, EXTENDED RELEASE ORAL DAILY
COMMUNITY
Start: 2022-03-22

## 2023-05-30 RX ORDER — KETOROLAC TROMETHAMINE 15 MG/ML
15 INJECTION, SOLUTION INTRAMUSCULAR; INTRAVENOUS ONCE
Status: DISCONTINUED | OUTPATIENT
Start: 2023-05-31 | End: 2023-05-30

## 2023-05-30 RX ADMIN — BUTALBITA,ACETAMINOPHEN AND CAFFEINE 1 CAPSULE: 50; 300; 40 CAPSULE ORAL at 23:53

## 2023-05-30 NOTE — ED PROVIDER NOTES
Time: 4:56 PM EDT  Date of encounter:  5/30/2023  Independent Historian/Clinical History and Information was obtained by:   Patient  Chief Complaint   Patient presents with   • Shortness of Breath   • Headache   • Hypertension       History is limited by: N/A    History of Present Illness:  Patient is a 63 y.o. year old female who presents to the emergency department for evaluation of elevated blood pressure.  Patient reports that she has been struggling with volatile pressures since Friday.  She took an extra amlodipine this afternoon but it does not seem like it helped much.  Also complains of shortness of breath, and a headache as well as some nausea that she attributes to the headache. She denies chest pain.  No recent medication changes.  RUEL. GLORIA Bunch    Patent denies cough, vomiting. Patient states she has been compliant with all medications. Patient states headache was a 7/10 when she came into ED but is now 5/10. Patient states she has been eating and drinking normally. Patient denies any head injury. Patient states has family hx of blood clots in head (Mother).     History provided by:  Patient   used: No        Patient Care Team  Primary Care Provider: Niranjan Law MD    Past Medical History:     No Known Allergies  Past Medical History:   Diagnosis Date   • COPD (chronic obstructive pulmonary disease)    • Depression    • Hypertension    • Pneumonia    • Sciatic leg pain     left leg     Past Surgical History:   Procedure Laterality Date   • CHOLECYSTECTOMY     • HERNIA REPAIR     • TUBAL ABDOMINAL LIGATION       History reviewed. No pertinent family history.    Home Medications:  Prior to Admission medications    Medication Sig Start Date End Date Taking? Authorizing Provider   cyclobenzaprine (FLEXERIL) 10 MG tablet Take 1 tablet by mouth 3 (Three) Times a Day. 4/19/22   Estevan Deluna MD   ketorolac (TORADOL) 10 MG tablet Take 1 tablet by mouth Every 6 (Six) Hours As  "Needed for Moderate Pain . 4/19/22   Estevan Deluna MD   ondansetron ODT (ZOFRAN-ODT) 4 MG disintegrating tablet Place 1 tablet on the tongue Every 8 (Eight) Hours As Needed for Nausea or Vomiting. 8/31/21   Inderjit Harrington MD        Social History:   Social History     Tobacco Use   • Smoking status: Former     Years: 30.00     Types: Cigarettes     Quit date: 2013     Years since quitting: 10.4   Substance Use Topics   • Alcohol use: Never         Review of Systems:  Review of Systems   Constitutional: Negative for chills and fever.   HENT: Negative for congestion, rhinorrhea and sore throat.    Eyes: Negative for photophobia.   Respiratory: Positive for shortness of breath. Negative for apnea, cough and chest tightness.    Cardiovascular: Negative for chest pain and palpitations.   Gastrointestinal: Positive for nausea. Negative for abdominal pain, diarrhea and vomiting.   Endocrine: Negative.    Genitourinary: Negative for difficulty urinating and dysuria.   Musculoskeletal: Negative for back pain, joint swelling and myalgias.   Skin: Negative for color change and wound.   Allergic/Immunologic: Negative.    Neurological: Positive for headaches. Negative for seizures.   Psychiatric/Behavioral: Negative.    All other systems reviewed and are negative.       Physical Exam:  /76   Pulse 56   Temp 98.5 °F (36.9 °C) (Oral)   Resp 18   Ht 154.9 cm (61\")   Wt 92.5 kg (203 lb 14.8 oz)   SpO2 93%   BMI 38.53 kg/m²     Physical Exam  Constitutional:       Appearance: Normal appearance.   HENT:      Head: Normocephalic and atraumatic.      Nose: Nose normal.      Mouth/Throat:      Mouth: Mucous membranes are moist.   Eyes:      Extraocular Movements: Extraocular movements intact.      Conjunctiva/sclera: Conjunctivae normal.      Pupils: Pupils are equal, round, and reactive to light.   Cardiovascular:      Rate and Rhythm: Normal rate and regular rhythm.      Pulses: Normal pulses.      Heart " sounds: Normal heart sounds.   Pulmonary:      Effort: Pulmonary effort is normal.      Breath sounds: Wheezing present.   Abdominal:      General: There is no distension.      Palpations: Abdomen is soft.      Tenderness: There is no abdominal tenderness.   Musculoskeletal:         General: Normal range of motion.      Cervical back: Normal range of motion.   Skin:     General: Skin is warm and dry.      Capillary Refill: Capillary refill takes less than 2 seconds.   Neurological:      General: No focal deficit present.      Mental Status: She is alert and oriented to person, place, and time. Mental status is at baseline.   Psychiatric:         Mood and Affect: Mood normal.         Behavior: Behavior normal.                  Procedures:  Procedures      Medical Decision Making:      Comorbidities that affect care:    Hypertension    External Notes reviewed:    Previous Clinic Note: Previous labs and radiological studies      The following orders were placed and all results were independently analyzed by me:  Orders Placed This Encounter   Procedures   • Urine Culture - Urine,   • XR Chest 1 View   • [place] Draw   • Comprehensive Metabolic Panel   • BNP   • Single High Sensitivity Troponin T   • CBC Auto Differential   • Urinalysis With Microscopic If Indicated (No Culture) - Urine, Clean Catch   • High Sensitivity Troponin T   • Urinalysis, Microscopic Only - Urine, Clean Catch   • High Sensitivity Troponin T 2Hr   • Continuous Pulse Oximetry   • Vital Signs   • ECG 12 Lead ED Triage Standing Order; SOA   • CBC & Differential   • Green Top (Gel)   • Lavender Top   • Gold Top - SST   • Light Blue Top       Medications Given in the Emergency Department:  Medications   butalbital-acetaminophen-caffeine (ORBIVAN) -40 MG capsule 1 capsule (1 capsule Oral Given 5/30/23 9336)        ED Course:    The patient was initially evaluated in the triage area where orders were placed. The patient was later dispositioned by  Inderjit Harrington MD.      The patient was advised to stay for completion of workup which includes but is not limited to communication of labs and radiological results, reassessment and plan. The patient was advised that leaving prior to disposition by a provider could result in critical findings that are not communicated to the patient.     ED Course as of 05/31/23 0646   Wed May 31, 2023   0644 ECG 12 Lead ED Triage Standing Order; SOA  Normal sinus rhythm with rate of 59. QRS normal. WA interval normal. QTc interval is normal. No ST elevation or depression. No T wave abnormalities. This EKG was interpreted by me.  [LD]      ED Course User Index  [LD] Inderjit Harrington MD       Labs:    Lab Results (last 24 hours)     Procedure Component Value Units Date/Time    CBC & Differential [[id number]]  (Abnormal) Collected: 05/30/23 1709    Specimen: Blood Updated: 05/30/23 1741    Narrative:      The following orders were created for panel order CBC & Differential.  Procedure                               Abnormality         Status                     ---------                               -----------         ------                     CBC Auto Differential[[id number]]        Abnormal            Final result                 Please view results for these tests on the individual orders.    Comprehensive Metabolic Panel [[id number]]  (Abnormal) Collected: 05/30/23 1709    Specimen: Blood Updated: 05/30/23 1807     Glucose 82 mg/dL      BUN 17 mg/dL      Creatinine 0.98 mg/dL      Sodium 142 mmol/L      Potassium 3.9 mmol/L      Chloride 101 mmol/L      CO2 31.7 mmol/L      Calcium 9.8 mg/dL      Total Protein 7.5 g/dL      Albumin 4.1 g/dL      ALT (SGPT) 8 U/L      AST (SGOT) 12 U/L      Alkaline Phosphatase 118 U/L      Total Bilirubin 0.4 mg/dL      Globulin 3.4 gm/dL      A/G Ratio 1.2 g/dL      BUN/Creatinine Ratio 17.3     Anion Gap 9.3 mmol/L      eGFR 64.2 mL/min/1.73     Narrative:      GFR Normal >60  Chronic  Kidney Disease <60  Kidney Failure <15      BNP [[id number]]  (Abnormal) Collected: 05/30/23 1709    Specimen: Blood Updated: 05/30/23 1802     proBNP 1,151.0 pg/mL     Narrative:      Among patients with dyspnea, NT-proBNP is highly sensitive for the detection of acute congestive heart failure. In addition NT-proBNP of <300 pg/ml effectively rules out acute congestive heart failure with 99% negative predictive value.      Single High Sensitivity Troponin T [[id number]]  (Abnormal) Collected: 05/30/23 1709    Specimen: Blood Updated: 05/30/23 1807     HS Troponin T 11 ng/L     Narrative:      High Sensitive Troponin T Reference Range:  <10.0 ng/L- Negative Female for AMI  <15.0 ng/L- Negative Male for AMI  >=10 - Abnormal Female indicating possible myocardial injury.  >=15 - Abnormal Male indicating possible myocardial injury.   Clinicians would have to utilize clinical acumen, EKG, Troponin, and serial changes to determine if it is an Acute Myocardial Infarction or myocardial injury due to an underlying chronic condition.         CBC Auto Differential [[id number]]  (Abnormal) Collected: 05/30/23 1709    Specimen: Blood Updated: 05/30/23 1741     WBC 9.60 10*3/mm3      RBC 5.14 10*6/mm3      Hemoglobin 15.1 g/dL      Hematocrit 45.9 %      MCV 89.3 fL      MCH 29.4 pg      MCHC 32.9 g/dL      RDW 14.5 %      RDW-SD 47.8 fl      MPV 9.8 fL      Platelets 240 10*3/mm3      Neutrophil % 66.3 %      Lymphocyte % 26.0 %      Monocyte % 4.8 %      Eosinophil % 1.9 %      Basophil % 0.7 %      Immature Grans % 0.3 %      Neutrophils, Absolute 6.36 10*3/mm3      Lymphocytes, Absolute 2.50 10*3/mm3      Monocytes, Absolute 0.46 10*3/mm3      Eosinophils, Absolute 0.18 10*3/mm3      Basophils, Absolute 0.07 10*3/mm3      Immature Grans, Absolute 0.03 10*3/mm3      nRBC 0.0 /100 WBC     Urinalysis With Microscopic If Indicated (No Culture) - Urine, Clean Catch [[id number]]  (Abnormal) Collected: 05/30/23 2120    Specimen: Urine,  Clean Catch Updated: 05/30/23 2140     Color, UA Yellow     Appearance, UA Cloudy     pH, UA 5.5     Specific Gravity, UA 1.028     Glucose, UA Negative     Ketones, UA 15 mg/dL (1+)     Bilirubin, UA Negative     Blood, UA Moderate (2+)     Protein, UA Trace     Leuk Esterase, UA Trace     Nitrite, UA Negative     Urobilinogen, UA 1.0 E.U./dL    Urinalysis, Microscopic Only - Urine, Clean Catch [[id number]]  (Abnormal) Collected: 05/30/23 2120    Specimen: Urine, Clean Catch Updated: 05/30/23 2203     RBC, UA 3-5 /HPF      WBC, UA 6-12 /HPF      Bacteria, UA 2+ /HPF      Squamous Epithelial Cells, UA 7-12 /HPF      Hyaline Casts, UA None Seen /LPF      Methodology Manual Light Microscopy    Urine Culture - Urine, Urine, Clean Catch [[id number]] Collected: 05/30/23 2120    Specimen: Urine, Clean Catch Updated: 05/31/23 0020    High Sensitivity Troponin T [[id number]]  (Abnormal) Collected: 05/30/23 2125    Specimen: Blood from Arm, Left Updated: 05/30/23 2159     HS Troponin T 10 ng/L     Narrative:      High Sensitive Troponin T Reference Range:  <10.0 ng/L- Negative Female for AMI  <15.0 ng/L- Negative Male for AMI  >=10 - Abnormal Female indicating possible myocardial injury.  >=15 - Abnormal Male indicating possible myocardial injury.   Clinicians would have to utilize clinical acumen, EKG, Troponin, and serial changes to determine if it is an Acute Myocardial Infarction or myocardial injury due to an underlying chronic condition.         High Sensitivity Troponin T 2Hr [[id number]]  (Abnormal) Collected: 05/30/23 2325    Specimen: Blood Updated: 05/31/23 0013     HS Troponin T 10 ng/L      Troponin T Delta 0 ng/L     Narrative:      High Sensitive Troponin T Reference Range:  <10.0 ng/L- Negative Female for AMI  <15.0 ng/L- Negative Male for AMI  >=10 - Abnormal Female indicating possible myocardial injury.  >=15 - Abnormal Male indicating possible myocardial injury.   Clinicians would have to utilize clinical  acumen, EKG, Troponin, and serial changes to determine if it is an Acute Myocardial Infarction or myocardial injury due to an underlying chronic condition.                Imaging:    XR Chest 1 View    Result Date: 5/30/2023  PROCEDURE: XR CHEST 1 VW  COMPARISON: [facility], CR, CHEST AP/PA 1 VIEW, 5/08/2016, 20:47.  [facility], CR, CHEST AP/PA 1 VIEW, 1/28/2020, 18:16.  [facility], CR, XR CHEST 1 VW, 4/19/2022, 20:54.  INDICATIONS: SHORTNESS OF BREATH TODAY  FINDINGS:  The heart is not definitely enlarged.  There is fullness to the superior mediastinum on the right which could reflect tortuous brachiocephalic vessels unchanged.  The visualized osseous structures do not appear unusual.        1. No acute pulmonary process.       LUCINDA HINTON MD       Electronically Signed and Approved By: LUCINDA HINTON MD on 5/30/2023 at 18:13                 Differential Diagnosis and Discussion:      ANTIBIOTICS: I considered prescribing antibiotics as an outpatient however with no urinary symptoms will hold off until culture available    All labs were reviewed and interpreted by me.  All X-rays impressions were independently interpreted by me.  EKG was interpreted by me.    MDM  Number of Diagnoses or Management Options  Hypertension, unspecified type  Nonintractable headache, unspecified chronicity pattern, unspecified headache type  Diagnosis management comments: Patient is afebrile nontoxic-appearing.  Vital signs are remarkable for an elevated blood pressure.  Blood pressure did improve.  Labs were obtained showed no significant abnormality.  Troponin was 11 repeat 10.  BNP was slightly elevated to 1100.  UA showed questionable urinary tract infection.  Patient does not have any symptoms.  Will hold off on treatment until cultures available.  Discussed option of getting a CT of her head.  Patient does not want a CT at this time.  She agreed to return for any concerning symptoms.   Recommend close follow-up with her primary physician.  Discussed return precautions, discharge instructions and answered all her questions.       Amount and/or Complexity of Data Reviewed  Clinical lab tests: reviewed  Tests in the radiology section of CPT®: reviewed  Review and summarize past medical records: yes  Independent visualization of images, tracings, or specimens: yes    Risk of Complications, Morbidity, and/or Mortality  Presenting problems: moderate  Management options: moderate             Patient Care Considerations:    ANTIBIOTICS: I considered prescribing antibiotics as an outpatient however with no urinary symptoms will hold off until culture available      Consultants/Shared Management Plan:    None    Social Determinants of Health:    Patient is independent, reliable, and has access to care.       Disposition and Care Coordination:    Discharged: The patient is suitable and stable for discharge with no need for consideration of observation or admission.    I have explained the patient´s condition, diagnoses and treatment plan based on the information available to me at this time. I have answered questions and addressed any concerns. The patient has a good  understanding of the patient´s diagnosis, condition, and treatment plan as can be expected at this point. The vital signs have been stable. The patient´s condition is stable and appropriate for discharge from the emergency department.      The patient will pursue further outpatient evaluation with the primary care physician or other designated or consulting physician as outlined in the discharge instructions. They are agreeable to this plan of care and follow-up instructions have been explained in detail. The patient has received these instructions in written format and have expressed an understanding of the discharge instructions. The patient is aware that any significant change in condition or worsening of symptoms should prompt an immediate  return to this or the closest emergency department or call to 911.  I have explained discharge medications and the need for follow up with the patient/caretakers. This was also printed in the discharge instructions. Patient was discharged with the following medications and follow up:      Medication List      No changes were made to your prescriptions during this visit.      Niranjan Law MD  [id number]  [place] [id number]  [phone]    In 2 days         Final diagnoses:   Hypertension, unspecified type   Nonintractable headache, unspecified chronicity pattern, unspecified headache type        ED Disposition     ED Disposition   Discharge    Condition   Stable    Comment   --             This medical record created using voice recognition software.    Documentation assistance provided by Clinton Hidalgo acting as scribe for No att. providers found. Information recorded by the scribe was done at my direction and has been verified and validated by me.              Clinton Hidalgo  05/30/23 0529      Inderjit Harrington MD  05/31/23 3798

## 2023-05-31 LAB
GEN 5 2HR TROPONIN T REFLEX: 10 NG/L
QT INTERVAL: 430 MS
TROPONIN T DELTA: 0 NG/L

## 2023-06-01 LAB — BACTERIA SPEC AEROBE CULT: NO GROWTH

## 2023-06-05 ENCOUNTER — TRANSCRIBE ORDERS (OUTPATIENT)
Dept: ADMINISTRATIVE | Facility: HOSPITAL | Age: 63
End: 2023-06-05
Payer: MEDICARE

## 2023-06-05 DIAGNOSIS — R19.8 FULLNESS OF ABDOMEN: Primary | ICD-10-CM

## 2024-01-04 ENCOUNTER — APPOINTMENT (OUTPATIENT)
Dept: CT IMAGING | Facility: HOSPITAL | Age: 64
End: 2024-01-04
Payer: MEDICARE

## 2024-01-04 ENCOUNTER — HOSPITAL ENCOUNTER (INPATIENT)
Facility: HOSPITAL | Age: 64
LOS: 4 days | Discharge: HOME | End: 2024-01-09
Attending: EMERGENCY MEDICINE | Admitting: STUDENT IN AN ORGANIZED HEALTH CARE EDUCATION/TRAINING PROGRAM
Payer: MEDICARE

## 2024-01-04 ENCOUNTER — APPOINTMENT (OUTPATIENT)
Dept: GENERAL RADIOLOGY | Facility: HOSPITAL | Age: 64
End: 2024-01-04
Payer: MEDICARE

## 2024-01-04 DIAGNOSIS — J44.1 COPD EXACERBATION: ICD-10-CM

## 2024-01-04 DIAGNOSIS — J96.21 ACUTE ON CHRONIC RESPIRATORY FAILURE WITH HYPOXIA: ICD-10-CM

## 2024-01-04 DIAGNOSIS — J44.9 CHRONIC OBSTRUCTIVE PULMONARY DISEASE, UNSPECIFIED COPD TYPE: ICD-10-CM

## 2024-01-04 DIAGNOSIS — J96.02 ACUTE RESPIRATORY FAILURE WITH HYPOXIA AND HYPERCAPNIA: Primary | ICD-10-CM

## 2024-01-04 DIAGNOSIS — J96.01 ACUTE RESPIRATORY FAILURE WITH HYPOXIA AND HYPERCAPNIA: Primary | ICD-10-CM

## 2024-01-04 LAB
ALBUMIN SERPL-MCNC: 3.7 G/DL (ref 3.5–5.2)
ALBUMIN/GLOB SERPL: 1.3 G/DL
ALP SERPL-CCNC: 155 U/L (ref 39–117)
ALT SERPL W P-5'-P-CCNC: 46 U/L (ref 1–33)
AMPHET+METHAMPHET UR QL: NEGATIVE
ANION GAP SERPL CALCULATED.3IONS-SCNC: 7.7 MMOL/L (ref 5–15)
APAP SERPL-MCNC: <5 MCG/ML (ref 0–30)
AST SERPL-CCNC: 52 U/L (ref 1–32)
BACTERIA UR QL AUTO: ABNORMAL /HPF
BARBITURATES UR QL SCN: NEGATIVE
BASE EXCESS BLDA CALC-SCNC: -1 MMOL/L (ref -2–2)
BASOPHILS # BLD AUTO: 0.02 10*3/MM3 (ref 0–0.2)
BASOPHILS NFR BLD AUTO: 0.2 % (ref 0–1.5)
BDY SITE: ABNORMAL
BENZODIAZ UR QL SCN: NEGATIVE
BILIRUB SERPL-MCNC: 0.3 MG/DL (ref 0–1.2)
BILIRUB UR QL STRIP: NEGATIVE
BUN SERPL-MCNC: 13 MG/DL (ref 8–23)
BUN/CREAT SERPL: 13.1 (ref 7–25)
CALCIUM SPEC-SCNC: 8.8 MG/DL (ref 8.6–10.5)
CANNABINOIDS SERPL QL: POSITIVE
CHLORIDE SERPL-SCNC: 95 MMOL/L (ref 98–107)
CLARITY UR: CLEAR
CO2 SERPL-SCNC: 29.3 MMOL/L (ref 22–29)
COCAINE UR QL: NEGATIVE
COHGB MFR BLD: 5 % (ref 0–1.5)
COLOR UR: YELLOW
CREAT SERPL-MCNC: 0.99 MG/DL (ref 0.57–1)
D-LACTATE SERPL-SCNC: 0.9 MMOL/L (ref 0.5–2)
D-LACTATE SERPL-SCNC: 4.6 MMOL/L (ref 0.5–2)
DEPRECATED RDW RBC AUTO: 49.2 FL (ref 37–54)
EGFRCR SERPLBLD CKD-EPI 2021: 63.4 ML/MIN/1.73
EOSINOPHIL # BLD AUTO: 0.01 10*3/MM3 (ref 0–0.4)
EOSINOPHIL NFR BLD AUTO: 0.1 % (ref 0.3–6.2)
ERYTHROCYTE [DISTWIDTH] IN BLOOD BY AUTOMATED COUNT: 14.8 % (ref 12.3–15.4)
ETHANOL BLD-MCNC: <10 MG/DL (ref 0–10)
ETHANOL UR QL: <0.01 %
FENTANYL UR-MCNC: NEGATIVE NG/ML
FHHB: 3.4 % (ref 0–5)
FLUAV SUBTYP SPEC NAA+PROBE: NOT DETECTED
FLUBV RNA ISLT QL NAA+PROBE: NOT DETECTED
GAS FLOW AIRWAY: 3 LPM
GLOBULIN UR ELPH-MCNC: 2.9 GM/DL
GLUCOSE SERPL-MCNC: 148 MG/DL (ref 65–99)
GLUCOSE UR STRIP-MCNC: ABNORMAL MG/DL
HCO3 BLDA-SCNC: 27.9 MMOL/L (ref 22–26)
HCT VFR BLD AUTO: 40.8 % (ref 34–46.6)
HGB BLD-MCNC: 13 G/DL (ref 12–15.9)
HGB BLDA-MCNC: 13.9 G/DL (ref 11.7–14.6)
HGB UR QL STRIP.AUTO: ABNORMAL
HOLD SPECIMEN: NORMAL
HYALINE CASTS UR QL AUTO: ABNORMAL /LPF
IMM GRANULOCYTES # BLD AUTO: 0.04 10*3/MM3 (ref 0–0.05)
IMM GRANULOCYTES NFR BLD AUTO: 0.5 % (ref 0–0.5)
INHALED O2 CONCENTRATION: 32 %
KETONES UR QL STRIP: NEGATIVE
LACTATE BLDA-SCNC: ABNORMAL MMOL/L
LEUKOCYTE ESTERASE UR QL STRIP.AUTO: NEGATIVE
LYMPHOCYTES # BLD AUTO: 0.92 10*3/MM3 (ref 0.7–3.1)
LYMPHOCYTES NFR BLD AUTO: 10.9 % (ref 19.6–45.3)
MCH RBC QN AUTO: 28.8 PG (ref 26.6–33)
MCHC RBC AUTO-ENTMCNC: 31.9 G/DL (ref 31.5–35.7)
MCV RBC AUTO: 90.3 FL (ref 79–97)
METHADONE UR QL SCN: POSITIVE
METHGB BLD QL: 0.2 % (ref 0–1.5)
MODALITY: ABNORMAL
MONOCYTES # BLD AUTO: 0.45 10*3/MM3 (ref 0.1–0.9)
MONOCYTES NFR BLD AUTO: 5.3 % (ref 5–12)
NEUTROPHILS NFR BLD AUTO: 7 10*3/MM3 (ref 1.7–7)
NEUTROPHILS NFR BLD AUTO: 83 % (ref 42.7–76)
NITRITE UR QL STRIP: NEGATIVE
NRBC BLD AUTO-RTO: 0 /100 WBC (ref 0–0.2)
OPIATES UR QL: NEGATIVE
OXYCODONE UR QL SCN: NEGATIVE
OXYHGB MFR BLDV: 91.4 % (ref 94–99)
PCO2 BLDA: 66.7 MM HG (ref 35–45)
PH BLDA: 7.24 PH UNITS (ref 7.35–7.45)
PH UR STRIP.AUTO: 5.5 [PH] (ref 5–8)
PLATELET # BLD AUTO: 169 10*3/MM3 (ref 140–450)
PMV BLD AUTO: 10 FL (ref 6–12)
PO2 BLD: 283 MM[HG] (ref 0–500)
PO2 BLDA: 90.5 MM HG (ref 80–100)
POTASSIUM SERPL-SCNC: 4.6 MMOL/L (ref 3.5–5.2)
PROT SERPL-MCNC: 6.6 G/DL (ref 6–8.5)
PROT UR QL STRIP: ABNORMAL
QT INTERVAL: 420 MS
QTC INTERVAL: 506 MS
RBC # BLD AUTO: 4.52 10*6/MM3 (ref 3.77–5.28)
RBC # UR STRIP: ABNORMAL /HPF
REF LAB TEST METHOD: ABNORMAL
RSV RNA NPH QL NAA+NON-PROBE: NOT DETECTED
SALICYLATES SERPL-MCNC: <0.3 MG/DL
SAO2 % BLDCOA: 96.4 % (ref 95–99)
SARS-COV-2 RNA RESP QL NAA+PROBE: NOT DETECTED
SODIUM SERPL-SCNC: 132 MMOL/L (ref 136–145)
SP GR UR STRIP: 1.02 (ref 1–1.03)
SQUAMOUS #/AREA URNS HPF: ABNORMAL /HPF
UROBILINOGEN UR QL STRIP: ABNORMAL
WBC # UR STRIP: ABNORMAL /HPF
WBC NRBC COR # BLD AUTO: 8.44 10*3/MM3 (ref 3.4–10.8)
WHOLE BLOOD HOLD COAG: NORMAL
WHOLE BLOOD HOLD COAG: NORMAL
WHOLE BLOOD HOLD SPECIMEN: NORMAL
WHOLE BLOOD HOLD SPECIMEN: NORMAL

## 2024-01-04 PROCEDURE — 80307 DRUG TEST PRSMV CHEM ANLYZR: CPT

## 2024-01-04 PROCEDURE — 85025 COMPLETE CBC W/AUTO DIFF WBC: CPT

## 2024-01-04 PROCEDURE — 94799 UNLISTED PULMONARY SVC/PX: CPT

## 2024-01-04 PROCEDURE — 82077 ASSAY SPEC XCP UR&BREATH IA: CPT

## 2024-01-04 PROCEDURE — G0378 HOSPITAL OBSERVATION PER HR: HCPCS

## 2024-01-04 PROCEDURE — 25510000001 IOPAMIDOL PER 1 ML: Performed by: EMERGENCY MEDICINE

## 2024-01-04 PROCEDURE — 93005 ELECTROCARDIOGRAM TRACING: CPT

## 2024-01-04 PROCEDURE — 36600 WITHDRAWAL OF ARTERIAL BLOOD: CPT | Performed by: EMERGENCY MEDICINE

## 2024-01-04 PROCEDURE — 25810000003 SODIUM CHLORIDE 0.9 % SOLUTION: Performed by: EMERGENCY MEDICINE

## 2024-01-04 PROCEDURE — 83050 HGB METHEMOGLOBIN QUAN: CPT | Performed by: EMERGENCY MEDICINE

## 2024-01-04 PROCEDURE — 87040 BLOOD CULTURE FOR BACTERIA: CPT

## 2024-01-04 PROCEDURE — 82805 BLOOD GASES W/O2 SATURATION: CPT | Performed by: EMERGENCY MEDICINE

## 2024-01-04 PROCEDURE — 83605 ASSAY OF LACTIC ACID: CPT

## 2024-01-04 PROCEDURE — 83605 ASSAY OF LACTIC ACID: CPT | Performed by: EMERGENCY MEDICINE

## 2024-01-04 PROCEDURE — 87899 AGENT NOS ASSAY W/OPTIC: CPT | Performed by: PHYSICIAN ASSISTANT

## 2024-01-04 PROCEDURE — 71045 X-RAY EXAM CHEST 1 VIEW: CPT

## 2024-01-04 PROCEDURE — 87637 SARSCOV2&INF A&B&RSV AMP PRB: CPT | Performed by: EMERGENCY MEDICINE

## 2024-01-04 PROCEDURE — 71260 CT THORAX DX C+: CPT

## 2024-01-04 PROCEDURE — 36415 COLL VENOUS BLD VENIPUNCTURE: CPT

## 2024-01-04 PROCEDURE — 82375 ASSAY CARBOXYHB QUANT: CPT | Performed by: EMERGENCY MEDICINE

## 2024-01-04 PROCEDURE — 87449 NOS EACH ORGANISM AG IA: CPT | Performed by: PHYSICIAN ASSISTANT

## 2024-01-04 PROCEDURE — 25810000003 SODIUM CHLORIDE 0.9 % SOLUTION

## 2024-01-04 PROCEDURE — 80053 COMPREHEN METABOLIC PANEL: CPT

## 2024-01-04 PROCEDURE — 99223 1ST HOSP IP/OBS HIGH 75: CPT | Performed by: STUDENT IN AN ORGANIZED HEALTH CARE EDUCATION/TRAINING PROGRAM

## 2024-01-04 PROCEDURE — 94640 AIRWAY INHALATION TREATMENT: CPT

## 2024-01-04 PROCEDURE — 99285 EMERGENCY DEPT VISIT HI MDM: CPT

## 2024-01-04 PROCEDURE — 86738 MYCOPLASMA ANTIBODY: CPT | Performed by: PHYSICIAN ASSISTANT

## 2024-01-04 PROCEDURE — 80179 DRUG ASSAY SALICYLATE: CPT

## 2024-01-04 PROCEDURE — 25010000002 METHYLPREDNISOLONE PER 125 MG: Performed by: EMERGENCY MEDICINE

## 2024-01-04 PROCEDURE — 80143 DRUG ASSAY ACETAMINOPHEN: CPT

## 2024-01-04 PROCEDURE — 93005 ELECTROCARDIOGRAM TRACING: CPT | Performed by: EMERGENCY MEDICINE

## 2024-01-04 PROCEDURE — 81001 URINALYSIS AUTO W/SCOPE: CPT | Performed by: EMERGENCY MEDICINE

## 2024-01-04 RX ORDER — SODIUM CHLORIDE 0.9 % (FLUSH) 0.9 %
10 SYRINGE (ML) INJECTION AS NEEDED
Status: DISCONTINUED | OUTPATIENT
Start: 2024-01-04 | End: 2024-01-09 | Stop reason: HOSPADM

## 2024-01-04 RX ORDER — METHYLPREDNISOLONE SODIUM SUCCINATE 125 MG/2ML
125 INJECTION, POWDER, LYOPHILIZED, FOR SOLUTION INTRAMUSCULAR; INTRAVENOUS ONCE
Status: COMPLETED | OUTPATIENT
Start: 2024-01-04 | End: 2024-01-04

## 2024-01-04 RX ORDER — IPRATROPIUM BROMIDE AND ALBUTEROL SULFATE 2.5; .5 MG/3ML; MG/3ML
3 SOLUTION RESPIRATORY (INHALATION) ONCE
Status: COMPLETED | OUTPATIENT
Start: 2024-01-04 | End: 2024-01-04

## 2024-01-04 RX ADMIN — SODIUM CHLORIDE 1000 ML: 9 INJECTION, SOLUTION INTRAVENOUS at 18:10

## 2024-01-04 RX ADMIN — IOPAMIDOL 67 ML: 755 INJECTION, SOLUTION INTRAVENOUS at 23:26

## 2024-01-04 RX ADMIN — SODIUM CHLORIDE 1000 ML: 9 INJECTION, SOLUTION INTRAVENOUS at 18:48

## 2024-01-04 RX ADMIN — IPRATROPIUM BROMIDE AND ALBUTEROL SULFATE 3 ML: .5; 3 SOLUTION RESPIRATORY (INHALATION) at 21:47

## 2024-01-04 RX ADMIN — METHYLPREDNISOLONE SODIUM SUCCINATE 125 MG: 125 INJECTION INTRAMUSCULAR; INTRAVENOUS at 21:34

## 2024-01-05 ENCOUNTER — APPOINTMENT (OUTPATIENT)
Dept: CARDIOLOGY | Facility: HOSPITAL | Age: 64
End: 2024-01-05
Payer: MEDICARE

## 2024-01-05 PROBLEM — J96.90 RESPIRATORY FAILURE: Status: ACTIVE | Noted: 2024-01-05

## 2024-01-05 LAB
ALBUMIN SERPL-MCNC: 3.4 G/DL (ref 3.5–5.2)
ALBUMIN/GLOB SERPL: 1.1 G/DL
ALP SERPL-CCNC: 151 U/L (ref 39–117)
ALT SERPL W P-5'-P-CCNC: 41 U/L (ref 1–33)
ANION GAP SERPL CALCULATED.3IONS-SCNC: 10.5 MMOL/L (ref 5–15)
ARTERIAL PATENCY WRIST A: POSITIVE
AST SERPL-CCNC: 42 U/L (ref 1–32)
BASE EXCESS BLDA CALC-SCNC: 0.4 MMOL/L (ref -2–2)
BASOPHILS # BLD AUTO: 0.02 10*3/MM3 (ref 0–0.2)
BASOPHILS NFR BLD AUTO: 0.3 % (ref 0–1.5)
BDY SITE: ABNORMAL
BH CV ECHO MEAS - AO MAX PG: 18.9 MMHG
BH CV ECHO MEAS - AO ROOT DIAM: 1.98 CM
BH CV ECHO MEAS - AO V2 MAX: 217.2 CM/SEC
BH CV ECHO MEAS - EDV(CUBED): 104.7 ML
BH CV ECHO MEAS - EDV(MOD-SP2): 99.1 ML
BH CV ECHO MEAS - EDV(MOD-SP4): 86.3 ML
BH CV ECHO MEAS - EF(MOD-SP2): 74.5 %
BH CV ECHO MEAS - EF(MOD-SP4): 70.2 %
BH CV ECHO MEAS - ESV(CUBED): 23.5 ML
BH CV ECHO MEAS - ESV(MOD-SP2): 25.3 ML
BH CV ECHO MEAS - ESV(MOD-SP4): 25.7 ML
BH CV ECHO MEAS - FS: 39.3 %
BH CV ECHO MEAS - IVS/LVPW: 1.09 CM
BH CV ECHO MEAS - IVSD: 1.21 CM
BH CV ECHO MEAS - LAT PEAK E' VEL: 7.1 CM/SEC
BH CV ECHO MEAS - LV MASS(C)D: 202.9 GRAMS
BH CV ECHO MEAS - LV MAX PG: 4.1 MMHG
BH CV ECHO MEAS - LV V1 MAX: 100.7 CM/SEC
BH CV ECHO MEAS - LVIDD: 4.7 CM
BH CV ECHO MEAS - LVIDS: 2.9 CM
BH CV ECHO MEAS - LVOT AREA: 3.2 CM2
BH CV ECHO MEAS - LVOT DIAM: 2.03 CM
BH CV ECHO MEAS - LVPWD: 1.11 CM
BH CV ECHO MEAS - MED PEAK E' VEL: 4.9 CM/SEC
BH CV ECHO MEAS - MV A MAX VEL: 103.3 CM/SEC
BH CV ECHO MEAS - MV DEC TIME: 0.24 SEC
BH CV ECHO MEAS - MV E MAX VEL: 107 CM/SEC
BH CV ECHO MEAS - MV E/A: 1.04
BH CV ECHO MEAS - PA V2 MAX: 82.8 CM/SEC
BH CV ECHO MEAS - SV(MOD-SP2): 73.8 ML
BH CV ECHO MEAS - SV(MOD-SP4): 60.6 ML
BH CV ECHO MEAS - TAPSE (>1.6): 2.7 CM
BH CV ECHO MEAS - TR MAX PG: 24.1 MMHG
BH CV ECHO MEAS - TR MAX VEL: 245.7 CM/SEC
BH CV ECHO MEASUREMENTS AVERAGE E/E' RATIO: 17.83
BH CV XLRA - TDI S': 13.7 CM/SEC
BILIRUB SERPL-MCNC: 0.3 MG/DL (ref 0–1.2)
BUN SERPL-MCNC: 11 MG/DL (ref 8–23)
BUN/CREAT SERPL: 15.5 (ref 7–25)
CALCIUM SPEC-SCNC: 8.7 MG/DL (ref 8.6–10.5)
CHLORIDE SERPL-SCNC: 100 MMOL/L (ref 98–107)
CO2 SERPL-SCNC: 24.5 MMOL/L (ref 22–29)
COHGB MFR BLD: 3.8 % (ref 0–1.5)
CREAT SERPL-MCNC: 0.71 MG/DL (ref 0.57–1)
DEPRECATED RDW RBC AUTO: 50.5 FL (ref 37–54)
EGFRCR SERPLBLD CKD-EPI 2021: 94.5 ML/MIN/1.73
EOSINOPHIL # BLD AUTO: 0 10*3/MM3 (ref 0–0.4)
EOSINOPHIL NFR BLD AUTO: 0 % (ref 0.3–6.2)
ERYTHROCYTE [DISTWIDTH] IN BLOOD BY AUTOMATED COUNT: 14.6 % (ref 12.3–15.4)
FHHB: 2.9 % (ref 0–5)
GAS FLOW AIRWAY: 2.5 LPM
GLOBULIN UR ELPH-MCNC: 3 GM/DL
GLUCOSE SERPL-MCNC: 113 MG/DL (ref 65–99)
HCO3 BLDA-SCNC: 27.1 MMOL/L (ref 22–26)
HCT VFR BLD AUTO: 40.6 % (ref 34–46.6)
HGB BLD-MCNC: 12.5 G/DL (ref 12–15.9)
HGB BLDA-MCNC: 13.9 G/DL (ref 11.7–14.6)
IMM GRANULOCYTES # BLD AUTO: 0.04 10*3/MM3 (ref 0–0.05)
IMM GRANULOCYTES NFR BLD AUTO: 0.6 % (ref 0–0.5)
INHALED O2 CONCENTRATION: 30 %
IVRT: 111 MS
L PNEUMO1 AG UR QL IA: NEGATIVE
LACTATE BLDA-SCNC: ABNORMAL MMOL/L
LEFT ATRIUM VOLUME INDEX: 39.3 ML/M2
LYMPHOCYTES # BLD AUTO: 0.91 10*3/MM3 (ref 0.7–3.1)
LYMPHOCYTES NFR BLD AUTO: 13.6 % (ref 19.6–45.3)
M PNEUMO IGM SER QL: NEGATIVE
MAGNESIUM SERPL-MCNC: 1.9 MG/DL (ref 1.6–2.4)
MCH RBC QN AUTO: 28.4 PG (ref 26.6–33)
MCHC RBC AUTO-ENTMCNC: 30.8 G/DL (ref 31.5–35.7)
MCV RBC AUTO: 92.3 FL (ref 79–97)
METHGB BLD QL: 0.1 % (ref 0–1.5)
MODALITY: ABNORMAL
MONOCYTES # BLD AUTO: 0.05 10*3/MM3 (ref 0.1–0.9)
MONOCYTES NFR BLD AUTO: 0.7 % (ref 5–12)
NEUTROPHILS NFR BLD AUTO: 5.68 10*3/MM3 (ref 1.7–7)
NEUTROPHILS NFR BLD AUTO: 84.8 % (ref 42.7–76)
NRBC BLD AUTO-RTO: 0 /100 WBC (ref 0–0.2)
NT-PROBNP SERPL-MCNC: 4300 PG/ML (ref 0–900)
OXYHGB MFR BLDV: 93.2 % (ref 94–99)
PCO2 BLDA: 52.4 MM HG (ref 35–45)
PH BLDA: 7.33 PH UNITS (ref 7.35–7.45)
PHOSPHATE SERPL-MCNC: 3.3 MG/DL (ref 2.5–4.5)
PLATELET # BLD AUTO: 147 10*3/MM3 (ref 140–450)
PMV BLD AUTO: 10 FL (ref 6–12)
PO2 BLD: 312 MM[HG] (ref 0–500)
PO2 BLDA: 93.5 MM HG (ref 80–100)
POTASSIUM SERPL-SCNC: 4.8 MMOL/L (ref 3.5–5.2)
PROT SERPL-MCNC: 6.4 G/DL (ref 6–8.5)
RBC # BLD AUTO: 4.4 10*6/MM3 (ref 3.77–5.28)
S PNEUM AG SPEC QL LA: NEGATIVE
SAO2 % BLDCOA: 97 % (ref 95–99)
SODIUM SERPL-SCNC: 135 MMOL/L (ref 136–145)
WBC NRBC COR # BLD AUTO: 6.7 10*3/MM3 (ref 3.4–10.8)

## 2024-01-05 PROCEDURE — 94799 UNLISTED PULMONARY SVC/PX: CPT

## 2024-01-05 PROCEDURE — 84100 ASSAY OF PHOSPHORUS: CPT | Performed by: STUDENT IN AN ORGANIZED HEALTH CARE EDUCATION/TRAINING PROGRAM

## 2024-01-05 PROCEDURE — 80053 COMPREHEN METABOLIC PANEL: CPT | Performed by: STUDENT IN AN ORGANIZED HEALTH CARE EDUCATION/TRAINING PROGRAM

## 2024-01-05 PROCEDURE — 63710000001 PREDNISONE PER 1 MG: Performed by: STUDENT IN AN ORGANIZED HEALTH CARE EDUCATION/TRAINING PROGRAM

## 2024-01-05 PROCEDURE — 83735 ASSAY OF MAGNESIUM: CPT | Performed by: STUDENT IN AN ORGANIZED HEALTH CARE EDUCATION/TRAINING PROGRAM

## 2024-01-05 PROCEDURE — 82375 ASSAY CARBOXYHB QUANT: CPT | Performed by: STUDENT IN AN ORGANIZED HEALTH CARE EDUCATION/TRAINING PROGRAM

## 2024-01-05 PROCEDURE — 94664 DEMO&/EVAL PT USE INHALER: CPT

## 2024-01-05 PROCEDURE — 83050 HGB METHEMOGLOBIN QUAN: CPT | Performed by: STUDENT IN AN ORGANIZED HEALTH CARE EDUCATION/TRAINING PROGRAM

## 2024-01-05 PROCEDURE — 25010000002 HEPARIN (PORCINE) PER 1000 UNITS: Performed by: STUDENT IN AN ORGANIZED HEALTH CARE EDUCATION/TRAINING PROGRAM

## 2024-01-05 PROCEDURE — 36600 WITHDRAWAL OF ARTERIAL BLOOD: CPT | Performed by: STUDENT IN AN ORGANIZED HEALTH CARE EDUCATION/TRAINING PROGRAM

## 2024-01-05 PROCEDURE — 93306 TTE W/DOPPLER COMPLETE: CPT | Performed by: INTERNAL MEDICINE

## 2024-01-05 PROCEDURE — 63710000001 REVEFENACIN 175 MCG/3ML SOLUTION: Performed by: STUDENT IN AN ORGANIZED HEALTH CARE EDUCATION/TRAINING PROGRAM

## 2024-01-05 PROCEDURE — 85025 COMPLETE CBC W/AUTO DIFF WBC: CPT | Performed by: STUDENT IN AN ORGANIZED HEALTH CARE EDUCATION/TRAINING PROGRAM

## 2024-01-05 PROCEDURE — 83880 ASSAY OF NATRIURETIC PEPTIDE: CPT | Performed by: PHYSICIAN ASSISTANT

## 2024-01-05 PROCEDURE — 93306 TTE W/DOPPLER COMPLETE: CPT

## 2024-01-05 PROCEDURE — 99232 SBSQ HOSP IP/OBS MODERATE 35: CPT | Performed by: INTERNAL MEDICINE

## 2024-01-05 PROCEDURE — 82805 BLOOD GASES W/O2 SATURATION: CPT | Performed by: STUDENT IN AN ORGANIZED HEALTH CARE EDUCATION/TRAINING PROGRAM

## 2024-01-05 PROCEDURE — 25010000002 FUROSEMIDE PER 20 MG: Performed by: PHYSICIAN ASSISTANT

## 2024-01-05 RX ORDER — AMOXICILLIN 250 MG
2 CAPSULE ORAL 2 TIMES DAILY
Status: DISCONTINUED | OUTPATIENT
Start: 2024-01-05 | End: 2024-01-09 | Stop reason: HOSPADM

## 2024-01-05 RX ORDER — PREDNISONE 20 MG/1
40 TABLET ORAL DAILY
Status: DISCONTINUED | OUTPATIENT
Start: 2024-01-05 | End: 2024-01-09 | Stop reason: HOSPADM

## 2024-01-05 RX ORDER — ATORVASTATIN CALCIUM 40 MG/1
40 TABLET, FILM COATED ORAL DAILY
Status: DISCONTINUED | OUTPATIENT
Start: 2024-01-05 | End: 2024-01-09 | Stop reason: HOSPADM

## 2024-01-05 RX ORDER — HEPARIN SODIUM 5000 [USP'U]/ML
5000 INJECTION, SOLUTION INTRAVENOUS; SUBCUTANEOUS EVERY 8 HOURS SCHEDULED
Status: DISCONTINUED | OUTPATIENT
Start: 2024-01-05 | End: 2024-01-09 | Stop reason: HOSPADM

## 2024-01-05 RX ORDER — FUROSEMIDE 10 MG/ML
20 INJECTION INTRAMUSCULAR; INTRAVENOUS ONCE
Status: COMPLETED | OUTPATIENT
Start: 2024-01-05 | End: 2024-01-05

## 2024-01-05 RX ORDER — SODIUM CHLORIDE 0.9 % (FLUSH) 0.9 %
10 SYRINGE (ML) INJECTION EVERY 12 HOURS SCHEDULED
Status: DISCONTINUED | OUTPATIENT
Start: 2024-01-05 | End: 2024-01-09 | Stop reason: HOSPADM

## 2024-01-05 RX ORDER — ARFORMOTEROL TARTRATE 15 UG/2ML
15 SOLUTION RESPIRATORY (INHALATION)
Status: DISCONTINUED | OUTPATIENT
Start: 2024-01-05 | End: 2024-01-09 | Stop reason: HOSPADM

## 2024-01-05 RX ORDER — METOPROLOL SUCCINATE 50 MG/1
100 TABLET, EXTENDED RELEASE ORAL DAILY
Status: DISCONTINUED | OUTPATIENT
Start: 2024-01-05 | End: 2024-01-05

## 2024-01-05 RX ORDER — PAROXETINE HYDROCHLORIDE 20 MG/1
40 TABLET, FILM COATED ORAL EVERY MORNING
Status: DISCONTINUED | OUTPATIENT
Start: 2024-01-05 | End: 2024-01-09 | Stop reason: HOSPADM

## 2024-01-05 RX ORDER — METOPROLOL SUCCINATE 50 MG/1
50 TABLET, EXTENDED RELEASE ORAL DAILY
Status: DISCONTINUED | OUTPATIENT
Start: 2024-01-05 | End: 2024-01-09 | Stop reason: HOSPADM

## 2024-01-05 RX ORDER — POLYETHYLENE GLYCOL 3350 17 G/17G
17 POWDER, FOR SOLUTION ORAL DAILY PRN
Status: DISCONTINUED | OUTPATIENT
Start: 2024-01-05 | End: 2024-01-09 | Stop reason: HOSPADM

## 2024-01-05 RX ORDER — BUDESONIDE 0.5 MG/2ML
0.5 INHALANT ORAL
Status: DISCONTINUED | OUTPATIENT
Start: 2024-01-05 | End: 2024-01-09 | Stop reason: HOSPADM

## 2024-01-05 RX ORDER — SODIUM CHLORIDE 0.9 % (FLUSH) 0.9 %
10 SYRINGE (ML) INJECTION AS NEEDED
Status: DISCONTINUED | OUTPATIENT
Start: 2024-01-05 | End: 2024-01-09 | Stop reason: HOSPADM

## 2024-01-05 RX ORDER — AMLODIPINE BESYLATE 5 MG/1
5 TABLET ORAL DAILY
Status: DISCONTINUED | OUTPATIENT
Start: 2024-01-05 | End: 2024-01-08

## 2024-01-05 RX ORDER — ASPIRIN 81 MG/1
81 TABLET ORAL DAILY
Status: DISCONTINUED | OUTPATIENT
Start: 2024-01-05 | End: 2024-01-09 | Stop reason: HOSPADM

## 2024-01-05 RX ORDER — BISACODYL 5 MG/1
5 TABLET, DELAYED RELEASE ORAL DAILY PRN
Status: DISCONTINUED | OUTPATIENT
Start: 2024-01-05 | End: 2024-01-09 | Stop reason: HOSPADM

## 2024-01-05 RX ORDER — DOXYCYCLINE 100 MG/1
100 CAPSULE ORAL EVERY 12 HOURS SCHEDULED
Status: DISCONTINUED | OUTPATIENT
Start: 2024-01-05 | End: 2024-01-09 | Stop reason: HOSPADM

## 2024-01-05 RX ORDER — ALBUTEROL SULFATE 2.5 MG/3ML
2.5 SOLUTION RESPIRATORY (INHALATION) EVERY 6 HOURS PRN
Status: DISCONTINUED | OUTPATIENT
Start: 2024-01-05 | End: 2024-01-09 | Stop reason: HOSPADM

## 2024-01-05 RX ORDER — SODIUM CHLORIDE 9 MG/ML
40 INJECTION, SOLUTION INTRAVENOUS AS NEEDED
Status: DISCONTINUED | OUTPATIENT
Start: 2024-01-05 | End: 2024-01-09 | Stop reason: HOSPADM

## 2024-01-05 RX ORDER — BISACODYL 10 MG
10 SUPPOSITORY, RECTAL RECTAL DAILY PRN
Status: DISCONTINUED | OUTPATIENT
Start: 2024-01-05 | End: 2024-01-09 | Stop reason: HOSPADM

## 2024-01-05 RX ADMIN — BUDESONIDE 0.5 MG: 0.5 INHALANT RESPIRATORY (INHALATION) at 19:08

## 2024-01-05 RX ADMIN — ARFORMOTEROL TARTRATE 15 MCG: 15 SOLUTION RESPIRATORY (INHALATION) at 19:08

## 2024-01-05 RX ADMIN — REVEFENACIN 175 MCG: 175 SOLUTION RESPIRATORY (INHALATION) at 07:20

## 2024-01-05 RX ADMIN — DOXYCYCLINE 100 MG: 100 CAPSULE ORAL at 22:00

## 2024-01-05 RX ADMIN — ARFORMOTEROL TARTRATE 15 MCG: 15 SOLUTION RESPIRATORY (INHALATION) at 01:23

## 2024-01-05 RX ADMIN — Medication 10 ML: at 22:00

## 2024-01-05 RX ADMIN — METOPROLOL SUCCINATE 50 MG: 50 TABLET, EXTENDED RELEASE ORAL at 10:05

## 2024-01-05 RX ADMIN — PREDNISONE 40 MG: 20 TABLET ORAL at 10:05

## 2024-01-05 RX ADMIN — ATORVASTATIN CALCIUM 40 MG: 40 TABLET, FILM COATED ORAL at 10:05

## 2024-01-05 RX ADMIN — DOCUSATE SODIUM 50MG AND SENNOSIDES 8.6MG 2 TABLET: 8.6; 5 TABLET, FILM COATED ORAL at 10:06

## 2024-01-05 RX ADMIN — HEPARIN SODIUM 5000 UNITS: 5000 INJECTION INTRAVENOUS; SUBCUTANEOUS at 06:42

## 2024-01-05 RX ADMIN — HEPARIN SODIUM 5000 UNITS: 5000 INJECTION INTRAVENOUS; SUBCUTANEOUS at 22:00

## 2024-01-05 RX ADMIN — BUDESONIDE 0.5 MG: 0.5 INHALANT RESPIRATORY (INHALATION) at 07:21

## 2024-01-05 RX ADMIN — Medication 10 ML: at 10:06

## 2024-01-05 RX ADMIN — ALBUTEROL SULFATE 2.5 MG: 2.5 SOLUTION RESPIRATORY (INHALATION) at 17:00

## 2024-01-05 RX ADMIN — ASPIRIN 81 MG: 81 TABLET, COATED ORAL at 10:05

## 2024-01-05 RX ADMIN — AMLODIPINE BESYLATE 5 MG: 5 TABLET ORAL at 10:05

## 2024-01-05 RX ADMIN — HEPARIN SODIUM 5000 UNITS: 5000 INJECTION INTRAVENOUS; SUBCUTANEOUS at 14:53

## 2024-01-05 RX ADMIN — PAROXETINE HYDROCHLORIDE 40 MG: 20 TABLET, FILM COATED ORAL at 10:05

## 2024-01-05 RX ADMIN — FUROSEMIDE 20 MG: 10 INJECTION, SOLUTION INTRAMUSCULAR; INTRAVENOUS at 14:53

## 2024-01-05 RX ADMIN — ARFORMOTEROL TARTRATE 15 MCG: 15 SOLUTION RESPIRATORY (INHALATION) at 07:20

## 2024-01-05 RX ADMIN — DOCUSATE SODIUM 50MG AND SENNOSIDES 8.6MG 2 TABLET: 8.6; 5 TABLET, FILM COATED ORAL at 22:00

## 2024-01-05 RX ADMIN — DOXYCYCLINE 100 MG: 100 CAPSULE ORAL at 10:05

## 2024-01-05 RX ADMIN — BUDESONIDE 0.5 MG: 0.5 INHALANT RESPIRATORY (INHALATION) at 01:23

## 2024-01-05 NOTE — H&P
[facility]   HOSPITALIST HISTORY AND PHYSICAL  Date: 2024   Patient Name: Linsey Manzanares  : 1960  MRN: [id number]  Primary Care Physician:  Niranjan Law MD  Date of admission: 2024    Subjective   Subjective     Chief Complaint:   Chief Complaint   Patient presents with    Altered Mental Status         HPI:    Linsey Manzanares is a 63 y.o. female  with a past medical history of COPD, hypertension, depression, hyperlipidemia presented to the ED for evaluation of altered mental status.  Patient was in her usual state of health this morning, this evening when patient's daughter got back to home from work, she found patient sitting in the chair minimally responsive with some drooling.  Daughter was not able to wake her up and EMS was called.  When the EMS arrived, patient was saturating around 80% on room air.  After she came to the ED patient has become more awake but still slightly somnolent.  Able to answer appropriately.  Oriented x 3.  Denies taking any medications more than prescribed.  But her urine screen is positive for methadone, which the patient is not on but she is on buprenorphine patch.  States that she has some more difficulty breathing since last several days associated with increased cough. Denies any fevers, chills, nausea, vomiting, chest pain, focal weakness, numbness, tingling.  Denies any alcohol intake.    In the ED, vital signs temperature 98.1, pulse 79, respiratory 13, blood pressure 155/93 on 3 L of nasal cannula saturating around 93%.  ABG 7.2 4/66/90 on 3 L of oxygen, sodium 132, potassium 4.6, chloride 95, bicarb 30 AST/ALT 52/46, , rest of the CMP with no significant findings, initial lactic acid was 4.6 and has improved 2.9, normal CBC, COVID flu and RSV pending.  Blood cultures in process.  Urinalysis is noninfectious.  Urine tox positive for THC, methadone.  EKG showed normal sinus rhythm.  Chest x-ray showed no acute abnormality.  Received nebulizer  treatment, methylprednisolone, IV fluids in the ED.  Patient has been admitted for further evaluation and management of acute hypoxic and hypercapnic respiratory failure, COPD exacerbation.    Personal History     Past Medical History:   Diagnosis Date    COPD (chronic obstructive pulmonary disease)     Depression     Hypertension     Pneumonia     Sciatic leg pain     left leg         Past Surgical History:   Procedure Laterality Date    CHOLECYSTECTOMY      HERNIA REPAIR      TUBAL ABDOMINAL LIGATION           History reviewed. No pertinent family history.      Social History     Socioeconomic History    Marital status:    Tobacco Use    Smoking status: Former     Years: 30     Types: Cigarettes     Quit date:      Years since quittin.0   Substance and Sexual Activity    Alcohol use: Never         Home Medications:  Buprenorphine, Fluticasone Furoate-Vilanterol, PARoxetine, albuterol sulfate HFA, amLODIPine, aspirin, atorvastatin, losartan, metoprolol succinate XL, ondansetron ODT, tiotropium, and triamcinolone    Allergies:  No Known Allergies    Review of Systems   All other systems reviewed and negative except as mentioned above in the HPI    Objective   Objective     Vitals:   Temp:  [96.3 °F (35.7 °C)-98.1 °F (36.7 °C)] 98.1 °F (36.7 °C)  Heart Rate:  [74-95] 75  Resp:  [13-24] 18  BP: (113-169)/() 157/90  Flow (L/min):  [3] 3    Physical Exam    Constitutional: Awake, alert, no acute distress   Eyes: Pupils equal, sclerae anicteric, no conjunctival injection   HENT: NCAT, mucous membranes moist   Respiratory: Lateral air entry present, mild diffuse wheezing bilaterally, nonlabored respirations    Cardiovascular: RRR, no murmurs, rubs, or gallops, palpable pedal pulses bilaterally   Gastrointestinal: Positive bowel sounds, soft, nontender, nondistended   Musculoskeletal: No bilateral ankle edema, no clubbing or cyanosis to extremities   Neurologic: Oriented x 3, strength symmetric in all  extremities, Cranial Nerves grossly intact to confrontation, speech clear    Result Review    Result Review:  I have personally reviewed the results from the time of this admission to 1/5/2024 00:07 EST and agree with these findings:  [x]  Laboratory  []  Microbiology  [x]  Radiology  [x]  EKG/Telemetry   []  Cardiology/Vascular   []  Pathology  []  Old records  []  Other:        Imaging Results (Last 24 Hours)       Procedure Component Value Units Date/Time    CT Chest With Contrast Diagnostic [[id number]] Resulted: 01/04/24 2330     Updated: 01/04/24 2333    XR Chest 1 View [[id number]] Collected: 01/04/24 1805     Updated: 01/04/24 1808    Narrative:      PROCEDURE: XR CHEST 1 VW     COMPARISON: [facility], CR, XR CHEST 1 VW, 5/30/2023, 17:51.     INDICATIONS: Cough     FINDINGS:   Heart size and pulmonary vasculature are within normal limits.  No gross infiltrate is   demonstrated.  Costophrenic angles are sharp       Impression:       No radiographic findings of pneumonia                  RAMANA VALENCIA MD         Electronically Signed and Approved By: RAMANA VALENCIA MD on 1/04/2024 at 18:05                                    Assessment & Plan   Assessment / Plan     Assessment/Plan:   Acute hypoxic and hypercapnic respiratory failure  COPD exacerbation  Combined respiratory and metabolic acidosis  Elevated lactic acid-resolved  Mild transaminitis  Hypertension  Hyperlipidemia  Depression    Plan  Admit to observation, telemetry  Wean oxygen as tolerated  Brovana, Pulmicort, revefenacin scheduled  Bronchodilator protocol  Albuterol every 6 hours as needed  Prednisone 40 mg daily for next 4 days  Repeat ABG at 2 AM to monitor pCO2 and pH, will place her on BiPAP if needed  Consider pulmonology consult in the a.m. depending on the clinical response  Nursing dysphagia screen  Heart healthy diet  Resume other appropriate home medications once reconciled      DVT prophylaxis:  No DVT prophylaxis order  currently exists.    CODE STATUS:    Level Of Support Discussed With: Patient  Code Status (Patient has no pulse and is not breathing): CPR (Attempt to Resuscitate)  Medical Interventions (Patient has pulse or is breathing): Full Support      Admission Status:  I believe this patient meets observation status.    Part of this note may be an electronic transcription/translation of spoken language to printed text using the Dragon Dictation System    Simba Verdugo MD

## 2024-01-05 NOTE — ED PROVIDER NOTES
Time: 10:25 PM EST  Date of encounter:  1/4/2024  Independent Historian/Clinical History and Information was obtained by:   Patient    History is limited by: N/A    Chief Complaint: Altered mental status      History of Present Illness:  Patient is a 63 y.o. year old female who presents to the emergency department for evaluation of altered mental status.  Per EMS the patient was found by her son with snoring respirations patient has possibly been abusing her pain medications.    HPI    Patient Care Team  Primary Care Provider: Niranjan Law MD    Past Medical History:     No Known Allergies  Past Medical History:   Diagnosis Date    COPD (chronic obstructive pulmonary disease)     Depression     Hypertension     Pneumonia     Sciatic leg pain     left leg     Past Surgical History:   Procedure Laterality Date    CHOLECYSTECTOMY      HERNIA REPAIR      TUBAL ABDOMINAL LIGATION       History reviewed. No pertinent family history.    Home Medications:  Prior to Admission medications    Medication Sig Start Date End Date Taking? Authorizing Provider   albuterol sulfate  (90 Base) MCG/ACT inhaler 2 puffs Every 4 (Four) Hours As Needed. 5/2/23   Hussein Jones MD   amLODIPine (NORVASC) 5 MG tablet Take 1 tablet by mouth Daily. 4/23/23   Hussein Jones MD   aspirin 81 MG EC tablet aspirin 81 mg oral tablet,delayed release (DR/EC) take 2 tablets (162 mg) by oral route once daily   Active    ProviderHussein MD   atorvastatin (Lipitor) 20 MG tablet Lipitor 20 mg oral tablet take 1 tablet (20 mg) by oral route once daily at bedtime   Active    ProviderHussein MD   Buprenorphine (BUTRANS) patch weekly transdermal patch APPLY 1 PATCH TOPICALLY TO THE SKIN 1 TIME A WEEK. 5/25/23   Hussein Jones MD   cyclobenzaprine (FLEXERIL) 10 MG tablet Take 1 tablet by mouth 3 (Three) Times a Day. 4/19/22   Estevan Deluna MD   Fluticasone Furoate-Vilanterol (BREO ELLIPTA) 200-25 MCG/ACT inhaler  "Inhale 1 puff Daily. 5/3/23   Hussein Jones MD   ketorolac (TORADOL) 10 MG tablet Take 1 tablet by mouth Every 6 (Six) Hours As Needed for Moderate Pain . 22   Estevan Deluna MD   losartan (COZAAR) 100 MG tablet Take 1 tablet by mouth Daily. 23   Hussein Jones MD   metoprolol succinate XL (TOPROL-XL) 100 MG 24 hr tablet Take 1 tablet by mouth Daily. 3/22/22   Hussein Jones MD   ondansetron ODT (ZOFRAN-ODT) 4 MG disintegrating tablet Place 1 tablet on the tongue Every 8 (Eight) Hours As Needed for Nausea or Vomiting. 21   Inderjit Harrington MD   PARoxetine (PAXIL) 20 MG tablet Take 1 tablet by mouth Every Morning. 23   Hussein Jones MD   Spiriva HandiHaler 18 MCG per inhalation capsule INHALE THE CONTENTS OF 1 CAPSULE USING HANDIHALER ONCE DAILY AS DIRECTED 23   Hussein Jones MD   triamcinolone (KENALOG) 0.5 % cream Apply  topically to the appropriate area as directed See Admin Instructions. Apply topically to the affected area twice daily 23   Hussein Jones MD        Social History:   Social History     Tobacco Use    Smoking status: Former     Years: 30     Types: Cigarettes     Quit date:      Years since quittin.0   Substance Use Topics    Alcohol use: Never         Review of Systems:  Review of Systems   Respiratory:  Positive for shortness of breath.    Psychiatric/Behavioral:  Positive for confusion.         Physical Exam:  /78 (BP Location: Right arm, Patient Position: Lying)   Pulse 55   Temp 98.2 °F (36.8 °C) (Oral)   Resp 18   Ht 154.9 cm (61\")   Wt 95.4 kg (210 lb 5.1 oz)   SpO2 94%   BMI 39.74 kg/m²     Physical Exam  Vitals and nursing note reviewed.   Constitutional:       General: She is not in acute distress.  HENT:      Head: Normocephalic and atraumatic.   Eyes:      Extraocular Movements: Extraocular movements intact.   Cardiovascular:      Rate and Rhythm: Normal rate and regular rhythm. "   Pulmonary:      Effort: Pulmonary effort is normal. No respiratory distress.      Comments: Patient has decreased breath sounds bilaterally  Abdominal:      General: Abdomen is flat.      Palpations: Abdomen is soft.      Tenderness: There is no abdominal tenderness.   Musculoskeletal:         General: Normal range of motion.      Cervical back: Normal range of motion and neck supple.   Skin:     General: Skin is warm and dry.   Neurological:      Mental Status: She is alert. She is confused.   Psychiatric:         Mood and Affect: Mood normal.                  Procedures:  Procedures      Medical Decision Making:      Comorbidities that affect care:    COPD, Hypertension    External Notes reviewed:    None      The following orders were placed and all results were independently analyzed by me:  Orders Placed This Encounter   Procedures    Oxygen Therapy    Blood Culture - Blood,    Blood Culture - Blood,    COVID-19, FLU A/B, RSV PCR 1 HR TAT - Swab, Nasopharynx    Respiratory Culture - Sputum, Cough    Legionella Antigen, Urine - Urine, Urine, Clean Catch    S. Pneumo Ag Urine or CSF - Urine, Urine, Clean Catch    Mycoplasma Pneumoniae Antibody, IgM - Blood, Arm, Left    XR Chest 1 View    CT Chest With Contrast Diagnostic    [place] Draw    Comprehensive Metabolic Panel    Acetaminophen Level    Ethanol    Salicylate Level    Urine Drug Screen - Urine, Clean Catch    CBC Auto Differential    Lactic Acid, Plasma    [place] Draw    Urinalysis With Culture If Indicated - Urine, Clean Catch    Urinalysis, Microscopic Only - Urine, Clean Catch    STAT Lactic Acid, Reflex    Blood Gas, Arterial -With Co-Ox Panel: Yes    Phosphorus    Magnesium    Comprehensive Metabolic Panel    Blood Gas, Arterial -With Co-Ox Panel: Yes    CBC Auto Differential    Renal Function Panel    CBC (No Diff)    Magnesium    BNP    Renal Function Panel    Magnesium    TSH    T4, Free    CBC Auto Differential    Renal Function Panel    CBC  (No Diff)    Basic Metabolic Panel    CBC (No Diff)    Magnesium    Ambulatory Referral to Pulmonology    Vital Signs    Undress & Gown    Undress & Gown    Continuous Pulse Oximetry    Vital Signs    Nursing Dysphagia Screening (Complete Prior to Giving Anything By Mouth)    Discontinue Telemetry    Discharge Follow-up with PCP    Activity as Tolerated    Diet: Cardiac Diets; Healthy Heart (2-3 Na+); Regular Texture (IDDSI 7); Thin (IDDSI 0)    Oscillating Positive Expiratory Pressure (OPEP)    POC Glucose Once    ECG 12 Lead Drug Monitoring    Adult Transthoracic Echo Complete W/ Cont if Necessary Per Protocol    Initiate Observation Status    Inpatient Admission    Discharge patient    CBC & Differential    Green Top (Gel)    Lavender Top    Gold Top - SST    Light Blue Top    Green Top (Gel)    Lavender Top    Gold Top - SST    Light Blue Top    CBC & Differential    CBC & Differential       Medications Given in the Emergency Department:  Medications   sodium chloride 0.9 % bolus 1,000 mL (0 mL Intravenous Stopped 1/4/24 1840)   sodium chloride 0.9 % bolus 1,000 mL (0 mL Intravenous Stopped 1/4/24 2119)   ipratropium-albuterol (DUO-NEB) nebulizer solution 3 mL (3 mL Nebulization Given 1/4/24 2147)   methylPREDNISolone sodium succinate (SOLU-Medrol) injection 125 mg (125 mg Intravenous Given 1/4/24 2134)   iopamidol (ISOVUE-370) 76 % injection 100 mL (67 mL Intravenous Given 1/4/24 2326)   furosemide (LASIX) injection 20 mg (20 mg Intravenous Given 1/5/24 1453)   furosemide (LASIX) injection 20 mg (20 mg Intravenous Given 1/6/24 0853)   sodium chloride 3 % nebulizer solution 4 mL (4 mL Nebulization Given 1/8/24 1832)        ED Course:         Labs:    Lab Results (last 24 hours)       ** No results found for the last 24 hours. **             Imaging:    No Radiology Exams Resulted Within Past 24 Hours      Differential Diagnosis and Discussion:    Altered Mental Status: Based on the patient's signs and symptoms,  differential diagnosis includes but is not limited to meningitis, stroke, sepsis, subarachnoid hemorrhage, intracranial bleeding, encephalitis, and metabolic encephalopathy.  Dyspnea: Differential diagnosis includes but is not limited to metabolic acidosis, neurological disorders, psychogenic, asthma, pneumothorax, upper airway obstruction, COPD, pneumonia, noncardiogenic pulmonary edema, interstitial lung disease, anemia, congestive heart failure, and pulmonary embolism    All labs were reviewed and interpreted by me.  All X-rays impressions were independently interpreted by me.  EKG was interpreted by me.  CT scan radiology impression was interpreted by me.    MDM  Patient was found to be hypercapnic and started on BiPAP.    Critical Care Note: Total Critical Care time of 45 minutes. Total critical care time documented does not include time spent on separately billed procedures for services of nurses or physician assistants. I personally saw and examined the patient. I have reviewed all diagnostic interpretations and treatment plans as written. I was present for the key portions of any procedures performed and the inclusive time noted in any critical care statement. Critical care time includes patient management by me, time spent at the patients bedside,  time to review lab and imaging results, discussing patient care, documentation in the medical record, and time spent with family or caregiver.        Patient Care Considerations:    SEPSIS was considered but is NOT present in the emergency department as SIRS criteria is not present.      Consultants/Shared Management Plan:    Hospitalist: I have discussed the case with Dr. Verdugo who agrees to accept the patient for admission.    Social Determinants of Health:    Patient is independent, reliable, and has access to care.       Disposition and Care Coordination:    Admit:   Through independent evaluation of the patient's history, physical, and imperical data, the  patient meets criteria for observation/admission to the hospital.        Final diagnoses:   Acute respiratory failure with hypoxia and hypercapnia        ED Disposition       ED Disposition   Decision to Admit    Condition   --    Comment   Level of Care: Telemetry [5]   Diagnosis: Acute respiratory failure with hypoxia and hypercapnia [[id number]]                 This medical record created using voice recognition software.             Sylvain Zarate DO  01/11/24 2703

## 2024-01-05 NOTE — SIGNIFICANT NOTE
01/05/24 1015   Coping/Psychosocial   Observed Emotional State calm;cooperative   Verbalized Emotional State relief;hopefulness   Trust Relationship/Rapport empathic listening provided   Family/Support Persons daughter   Involvement in Care interacting with patient   Additional Documentation Spiritual Care (Group)   Spiritual Care   Use of Spiritual Resources non-Hoahaoism use of spiritual care   Spiritual Care Source  initiative   Spiritual Care Follow-Up follow-up, none required as presently assessed   Response to Spiritual Care receptive of support   Spiritual Care Interventions supportive conversation provided   Spiritual Care Visit Type initial   Receptivity to Spiritual Care visit welcomed

## 2024-01-05 NOTE — PROGRESS NOTES
[facility]   Hospitalist Progress Note  Date: 2024  Patient Name: Linsey Manzanares  : 1960  MRN: [id number]  Date of admission: 2024      Subjective   Subjective     Chief Complaint: Shortness of air    Summary: Patient 63-year-old female past medical history significant for COPD, hypertension, hyperlipidemia, depression presents to the emergency department with shortness of air and altered mental status after taking a friend's methadone which she was given help her with her pain in the emergency department noted to have hypercapnic/hypoxic respiratory failure.  Patient does report having increasing shortness of air cough and wheezing over the past several days prior to admission after evaluation emergency department patient was admitted to hospital service for further medical evaluation and treatment.      Interval Followup: Patient seen and examined blood gases have improved patient is awake alert oriented answering questions continues to have shortness of air and wheezing O2 sats 95% 2.5 L nasal cannula has some lower extremity edema BNP was checked and elevated at 4000 we will give IV Lasix additionally will check 2D echocardiogram.  Continue with nebulizer treatments continue with p.o. steroids adding antibiotics    Review of systems: All systems reviewed and negative septa following: Patient complains of continued shortness of air  Objective   Objective     Vitals:   Temp:  [96.3 °F (35.7 °C)-98.1 °F (36.7 °C)] 97.2 °F (36.2 °C)  Heart Rate:  [69-95] 84  Resp:  [13-24] 18  BP: (113-169)/() 135/64  Flow (L/min):  [2.5-3] 2.5    Physical Exam   Constitutional: Awake alert oriented no acute distress  Respiratory: Expiratory wheezes increased expiratory phase  Cardiovascular: RRR  GI: Abdomen soft nontender bowel sounds present  Extremities: Trace lower extremity edema noted    Result Review    Result Review:  I have reviewed results from the last 24 hours:  [x]  Laboratory  CBC           5/30/2023    17:09 1/4/2024    18:15 1/5/2024    04:48   CBC   WBC 9.60  8.44  6.70    RBC 5.14  4.52  4.40    Hemoglobin 15.1  13.0  12.5    Hematocrit 45.9  40.8  40.6    MCV 89.3  90.3  92.3    MCH 29.4  28.8  28.4    MCHC 32.9  31.9  30.8    RDW 14.5  14.8  14.6    Platelets 240  169  147      CMP          5/30/2023    17:09 1/4/2024    16:52 1/5/2024    04:48   CMP   Glucose 82  148  113    BUN 17  13  11    Creatinine 0.98  0.99  0.71    EGFR 64.2  63.4  94.5    Sodium 142  132  135    Potassium 3.9  4.6  4.8    Chloride 101  95  100    Calcium 9.8  8.8  8.7    Total Protein 7.5  6.6  6.4    Albumin 4.1  3.7  3.4    Globulin 3.4  2.9  3.0    Total Bilirubin 0.4  0.3  0.3    Alkaline Phosphatase 118  155  151    AST (SGOT) 12  52  42    ALT (SGPT) 8  46  41    Albumin/Globulin Ratio 1.2  1.3  1.1    BUN/Creatinine Ratio 17.3  13.1  15.5    Anion Gap 9.3  7.7  10.5        [x]  Microbiology  [x]  Radiology  CT Chest With Contrast Diagnostic    Result Date: 1/5/2024      1. No pulmonary embolism.   2. No acute infiltrate is seen.   3. There is a suspected incidental multinodular goiter, as discussed.  4. Atherosclerotic changes are seen, including involvement the coronary arteries.   5. Mild cardiomegaly is suspected.   6. Please see above comments for further detail.    Please note that portions of this note were completed with a voice recognition program.  EUGENIE PATEL JR, MD       Electronically Signed and Approved By: EUGENIE PATEL JR, MD on 1/05/2024 at 0:42              XR Chest 1 View    Result Date: 1/4/2024   No radiographic findings of pneumonia       RAMANA VALENCIA MD       Electronically Signed and Approved By: RAMANA VALENCIA MD on 1/04/2024 at 18:05              []  EKG/Telemetry   []  Cardiology/Vascular   []  Pathology  []  Old records  []  Other:    Assessment & Plan   Assessment / Plan   Assessment:    Acute hypoxic/hypercapnic respiratory failure  Acute exacerbation chronic obstructive  pulmonary disease  Elevated BNP  Altered mental status secondary to methadone resolved  Mild transaminitis  Lactic acidosis resolved  Hypertension  Hyperlipidemia  Depression    Plan:    Continue nebulizer treatments  Continue oral steroids with prednisone  Adding antibiotics  Lasix intravenous x 1  Check 2D echocardiogram  Continue supplemental oxygen wean as tolerated will need 6-minute walk prior to discharge to assess for home oxygen needs  Suspect patient's hypercapnia as a result of COPD exacerbation plus methadone  Repeat a.m. labs  Resuming appropriate home medications  PT OT consultations  Further treatment contingent upon her hospital course     Discussed care plan with RN 1/5/2024      DVT prophylaxis:  Medical DVT prophylaxis orders are present.    CODE STATUS:   Level Of Support Discussed With: Patient  Code Status (Patient has no pulse and is not breathing): CPR (Attempt to Resuscitate)  Medical Interventions (Patient has pulse or is breathing): Full Support        Electronically signed by YAZMIN Noland, 01/05/24, 1:48 PM EST.    Attending Documentation:  Patient independently seen and evaluated, above documentation reflects plan put forth during bedside rounds.  More than 51% of the time of this patient encounter was performed by me. I discussed the care plan with CARMENZA Sutherland PA-C, I agree with his findings and plan as documented, what I have added to the care plan and modified is as follows in my documentation and my medical decision making; 63-year-old female with COPD and other medical problems presented to the ED with shortness of breath, altered mental status after reportedly taking a friend's methadone to help her with pain.  She had increasing shortness of breath over the past several days prior to admission.  She was given Lasix, antibiotics, steroids.  Nebulizers helping.  Overall improving.  Getting 2D echo.  Patient is awake and alert and oriented this afternoon.  Will continue  current care as above.  Electronically signed by Pool Alford MD, 01/05/24, 3:41 PM EST.

## 2024-01-05 NOTE — PLAN OF CARE
Goal Outcome Evaluation:  Plan of Care Reviewed With: patient        Progress: improving  Outcome Evaluation: VSS. A&Ox3. Pt states no memory of status or events immeadiatly prior to EMS transport. Stated she took some methadone someone gave her due to severe pain. States she has no thoughts of hurting herself just wanted pain relief. Pt educated on dangers of taking medication not prescribed to her and stated she is never doing it again. Will continue to monitor.

## 2024-01-05 NOTE — PLAN OF CARE
Goal Outcome Evaluation:  Plan of Care Reviewed With: patient         VSS during shift. ECHO completed, see notes. Patient has been pleasant and A/O all shift. Patient had no complaints of pain. Continue plan of care.

## 2024-01-06 LAB
ALBUMIN SERPL-MCNC: 3.3 G/DL (ref 3.5–5.2)
ANION GAP SERPL CALCULATED.3IONS-SCNC: 7.7 MMOL/L (ref 5–15)
BUN SERPL-MCNC: 19 MG/DL (ref 8–23)
BUN/CREAT SERPL: 21.3 (ref 7–25)
CALCIUM SPEC-SCNC: 8.9 MG/DL (ref 8.6–10.5)
CHLORIDE SERPL-SCNC: 102 MMOL/L (ref 98–107)
CO2 SERPL-SCNC: 28.3 MMOL/L (ref 22–29)
CREAT SERPL-MCNC: 0.89 MG/DL (ref 0.57–1)
DEPRECATED RDW RBC AUTO: 50.4 FL (ref 37–54)
EGFRCR SERPLBLD CKD-EPI 2021: 72.1 ML/MIN/1.73
ERYTHROCYTE [DISTWIDTH] IN BLOOD BY AUTOMATED COUNT: 15 % (ref 12.3–15.4)
GLUCOSE SERPL-MCNC: 120 MG/DL (ref 65–99)
HCT VFR BLD AUTO: 38.5 % (ref 34–46.6)
HGB BLD-MCNC: 11.9 G/DL (ref 12–15.9)
MAGNESIUM SERPL-MCNC: 1.9 MG/DL (ref 1.6–2.4)
MCH RBC QN AUTO: 28.1 PG (ref 26.6–33)
MCHC RBC AUTO-ENTMCNC: 30.9 G/DL (ref 31.5–35.7)
MCV RBC AUTO: 91 FL (ref 79–97)
PHOSPHATE SERPL-MCNC: 2.3 MG/DL (ref 2.5–4.5)
PLATELET # BLD AUTO: 152 10*3/MM3 (ref 140–450)
PMV BLD AUTO: 10.3 FL (ref 6–12)
POTASSIUM SERPL-SCNC: 4.2 MMOL/L (ref 3.5–5.2)
RBC # BLD AUTO: 4.23 10*6/MM3 (ref 3.77–5.28)
SODIUM SERPL-SCNC: 138 MMOL/L (ref 136–145)
T4 FREE SERPL-MCNC: 0.99 NG/DL (ref 0.93–1.7)
TSH SERPL DL<=0.05 MIU/L-ACNC: 0.64 UIU/ML (ref 0.27–4.2)
WBC NRBC COR # BLD AUTO: 11.43 10*3/MM3 (ref 3.4–10.8)

## 2024-01-06 PROCEDURE — 94799 UNLISTED PULMONARY SVC/PX: CPT

## 2024-01-06 PROCEDURE — 99233 SBSQ HOSP IP/OBS HIGH 50: CPT | Performed by: INTERNAL MEDICINE

## 2024-01-06 PROCEDURE — 94664 DEMO&/EVAL PT USE INHALER: CPT

## 2024-01-06 PROCEDURE — 80069 RENAL FUNCTION PANEL: CPT | Performed by: PHYSICIAN ASSISTANT

## 2024-01-06 PROCEDURE — 84443 ASSAY THYROID STIM HORMONE: CPT | Performed by: PHYSICIAN ASSISTANT

## 2024-01-06 PROCEDURE — 25010000002 HEPARIN (PORCINE) PER 1000 UNITS: Performed by: STUDENT IN AN ORGANIZED HEALTH CARE EDUCATION/TRAINING PROGRAM

## 2024-01-06 PROCEDURE — 84439 ASSAY OF FREE THYROXINE: CPT | Performed by: PHYSICIAN ASSISTANT

## 2024-01-06 PROCEDURE — 83735 ASSAY OF MAGNESIUM: CPT | Performed by: PHYSICIAN ASSISTANT

## 2024-01-06 PROCEDURE — 25010000002 FUROSEMIDE PER 20 MG: Performed by: PHYSICIAN ASSISTANT

## 2024-01-06 PROCEDURE — 63710000001 PREDNISONE PER 1 MG: Performed by: STUDENT IN AN ORGANIZED HEALTH CARE EDUCATION/TRAINING PROGRAM

## 2024-01-06 PROCEDURE — 63710000001 REVEFENACIN 175 MCG/3ML SOLUTION: Performed by: STUDENT IN AN ORGANIZED HEALTH CARE EDUCATION/TRAINING PROGRAM

## 2024-01-06 PROCEDURE — 85027 COMPLETE CBC AUTOMATED: CPT | Performed by: PHYSICIAN ASSISTANT

## 2024-01-06 RX ORDER — GUAIFENESIN 600 MG/1
600 TABLET, EXTENDED RELEASE ORAL EVERY 12 HOURS SCHEDULED
Status: DISCONTINUED | OUTPATIENT
Start: 2024-01-06 | End: 2024-01-09 | Stop reason: HOSPADM

## 2024-01-06 RX ORDER — SODIUM CHLORIDE FOR INHALATION 3 %
4 VIAL, NEBULIZER (ML) INHALATION
Status: COMPLETED | OUTPATIENT
Start: 2024-01-06 | End: 2024-01-08

## 2024-01-06 RX ORDER — FUROSEMIDE 10 MG/ML
20 INJECTION INTRAMUSCULAR; INTRAVENOUS ONCE
Status: COMPLETED | OUTPATIENT
Start: 2024-01-06 | End: 2024-01-06

## 2024-01-06 RX ADMIN — BUDESONIDE 0.5 MG: 0.5 INHALANT RESPIRATORY (INHALATION) at 19:17

## 2024-01-06 RX ADMIN — GUAIFENESIN 600 MG: 600 TABLET ORAL at 09:41

## 2024-01-06 RX ADMIN — GUAIFENESIN 600 MG: 600 TABLET ORAL at 21:34

## 2024-01-06 RX ADMIN — ASPIRIN 81 MG: 81 TABLET, COATED ORAL at 08:53

## 2024-01-06 RX ADMIN — AMLODIPINE BESYLATE 5 MG: 5 TABLET ORAL at 08:53

## 2024-01-06 RX ADMIN — PREDNISONE 40 MG: 20 TABLET ORAL at 08:54

## 2024-01-06 RX ADMIN — REVEFENACIN 175 MCG: 175 SOLUTION RESPIRATORY (INHALATION) at 10:13

## 2024-01-06 RX ADMIN — DOXYCYCLINE 100 MG: 100 CAPSULE ORAL at 21:34

## 2024-01-06 RX ADMIN — BUDESONIDE 0.5 MG: 0.5 INHALANT RESPIRATORY (INHALATION) at 10:13

## 2024-01-06 RX ADMIN — ATORVASTATIN CALCIUM 40 MG: 40 TABLET, FILM COATED ORAL at 08:54

## 2024-01-06 RX ADMIN — DOCUSATE SODIUM 50MG AND SENNOSIDES 8.6MG 2 TABLET: 8.6; 5 TABLET, FILM COATED ORAL at 08:53

## 2024-01-06 RX ADMIN — SODIUM CHLORIDE SOLN NEBU 3% 4 ML: 3 NEBU SOLN at 19:17

## 2024-01-06 RX ADMIN — PAROXETINE HYDROCHLORIDE 40 MG: 20 TABLET, FILM COATED ORAL at 08:53

## 2024-01-06 RX ADMIN — ARFORMOTEROL TARTRATE 15 MCG: 15 SOLUTION RESPIRATORY (INHALATION) at 19:17

## 2024-01-06 RX ADMIN — Medication 10 ML: at 21:34

## 2024-01-06 RX ADMIN — HEPARIN SODIUM 5000 UNITS: 5000 INJECTION INTRAVENOUS; SUBCUTANEOUS at 05:40

## 2024-01-06 RX ADMIN — ARFORMOTEROL TARTRATE 15 MCG: 15 SOLUTION RESPIRATORY (INHALATION) at 10:13

## 2024-01-06 RX ADMIN — Medication 10 ML: at 08:53

## 2024-01-06 RX ADMIN — DOXYCYCLINE 100 MG: 100 CAPSULE ORAL at 08:54

## 2024-01-06 RX ADMIN — DOCUSATE SODIUM 50MG AND SENNOSIDES 8.6MG 2 TABLET: 8.6; 5 TABLET, FILM COATED ORAL at 21:34

## 2024-01-06 RX ADMIN — FUROSEMIDE 20 MG: 10 INJECTION, SOLUTION INTRAMUSCULAR; INTRAVENOUS at 08:53

## 2024-01-06 RX ADMIN — HEPARIN SODIUM 5000 UNITS: 5000 INJECTION INTRAVENOUS; SUBCUTANEOUS at 21:34

## 2024-01-06 RX ADMIN — METOPROLOL SUCCINATE 50 MG: 50 TABLET, EXTENDED RELEASE ORAL at 08:54

## 2024-01-06 RX ADMIN — HEPARIN SODIUM 5000 UNITS: 5000 INJECTION INTRAVENOUS; SUBCUTANEOUS at 15:19

## 2024-01-06 RX ADMIN — SODIUM CHLORIDE SOLN NEBU 3% 4 ML: 3 NEBU SOLN at 10:13

## 2024-01-06 NOTE — PROGRESS NOTES
[facility]   Hospitalist Progress Note  Date: 2024  Patient Name: Linsey Manzanares  : 1960  MRN: [id number]  Date of admission: 2024      Subjective   Subjective     Chief Complaint: Shortness of air    Summary: Patient 63-year-old female past medical history significant for COPD, hypertension, hyperlipidemia, depression presents to the emergency department with shortness of air and altered mental status after taking a friend's methadone which she was given help her with her pain in the emergency department noted to have hypercapnic/hypoxic respiratory failure.  Patient does report having increasing shortness of air cough and wheezing over the past several days prior to admission after evaluation emergency department patient was admitted to hospital service for further medical evaluation and treatment.      Interval Followup: Patient seen and examined patient remains short of air we will redosed IV Lasix today continue with nebulizer treatments continue with antibiotics adding mucolytic's flutter valve and spirometry      Review of systems: All systems reviewed and negative septa following: Patient complains of continued shortness of air  Objective   Objective     Vitals:   Temp:  [97.2 °F (36.2 °C)-98.2 °F (36.8 °C)] 97.7 °F (36.5 °C)  Heart Rate:  [68-84] 68  Resp:  [12-24] 18  BP: (127-167)/(59-89) 167/78  Flow (L/min):  [2-3] 2    Physical Exam   Constitutional: Awake alert oriented no acute distress  Respiratory: Expiratory wheezes increased expiratory phase  Cardiovascular: RRR  GI: Abdomen soft nontender bowel sounds present  Extremities: Trace lower extremity edema noted    Result Review    Result Review:  I have reviewed results from the last 24 hours:  [x]  Laboratory  CBC          2024    18:15 2024    04:48 2024    05:32   CBC   WBC 8.44  6.70  11.43    RBC 4.52  4.40  4.23    Hemoglobin 13.0  12.5  11.9    Hematocrit 40.8  40.6  38.5    MCV 90.3  92.3  91.0    MCH 28.8   28.4  28.1    MCHC 31.9  30.8  30.9    RDW 14.8  14.6  15.0    Platelets 169  147  152      CMP          1/4/2024    16:52 1/5/2024    04:48 1/6/2024    05:32   CMP   Glucose 148  113  120    BUN 13  11  19    Creatinine 0.99  0.71  0.89    EGFR 63.4  94.5  72.1    Sodium 132  135  138    Potassium 4.6  4.8  4.2    Chloride 95  100  102    Calcium 8.8  8.7  8.9    Total Protein 6.6  6.4     Albumin 3.7  3.4  3.3    Globulin 2.9  3.0     Total Bilirubin 0.3  0.3     Alkaline Phosphatase 155  151     AST (SGOT) 52  42     ALT (SGPT) 46  41     Albumin/Globulin Ratio 1.3  1.1     BUN/Creatinine Ratio 13.1  15.5  21.3    Anion Gap 7.7  10.5  7.7        [x]  Microbiology  [x]  Radiology  CT Chest With Contrast Diagnostic    Result Date: 1/5/2024      1. No pulmonary embolism.   2. No acute infiltrate is seen.   3. There is a suspected incidental multinodular goiter, as discussed.  4. Atherosclerotic changes are seen, including involvement the coronary arteries.   5. Mild cardiomegaly is suspected.   6. Please see above comments for further detail.    Please note that portions of this note were completed with a voice recognition program.  EUGENIE PATEL JR, MD       Electronically Signed and Approved By: EUGENIE PATEL JR, MD on 1/05/2024 at 0:42              XR Chest 1 View    Result Date: 1/4/2024   No radiographic findings of pneumonia       RAMANA VALENCIA MD       Electronically Signed and Approved By: RAMANA VALENCIA MD on 1/04/2024 at 18:05              []  EKG/Telemetry   []  Cardiology/Vascular   []  Pathology  []  Old records  []  Other:    Assessment & Plan   Assessment / Plan   Assessment:    Acute hypoxic/hypercapnic respiratory failure  Acute exacerbation chronic obstructive pulmonary disease  Elevated BNP  Altered mental status secondary to methadone resolved  Mild transaminitis  Lactic acidosis resolved  Hypertension  Hyperlipidemia  Depression    Plan:    Continue antibiotics  Redosed IV Lasix today  2D  echocardiogram reviewed preserved ejection fraction diastolic dysfunction mild aortic stenosis  Continue nebulizer treatments  Adding flutter valve Mucinex and saline nebs  Continue oral steroids with prednisone  Continue supplemental oxygen wean as tolerated will need 6-minute walk prior to discharge to assess for home oxygen needs  Suspect patient's hypercapnia as a result of COPD exacerbation plus methadone  Repeat a.m. labs  Resuming appropriate home medications  PT OT consultations  Further treatment contingent upon her hospital course     Discussed care plan with RN 1/6/2024      DVT prophylaxis:  Medical DVT prophylaxis orders are present.    CODE STATUS:   Level Of Support Discussed With: Patient  Code Status (Patient has no pulse and is not breathing): CPR (Attempt to Resuscitate)  Medical Interventions (Patient has pulse or is breathing): Full Support      Electronically signed by YAZMIN Noland, 01/06/24, 10:51 AM EST.      Attending Documentation:  Patient independently seen and evaluated, above documentation reflects plan put forth during bedside rounds.  More than 51% of the time of this patient encounter was performed by me. I discussed the care plan with CARMENZA Sutherland PA-C, I agree with his findings and plan as documented, what I have added to the care plan and modified is as follows in my documentation and my medical decision making; 63-year-old female with COPD and other medical problems presented to the ED with shortness of breath, altered mental status after reportedly taking a friend's methadone to help her with pain.  She had increasing shortness of breath over the past several days prior to admission.  She was given Lasix, antibiotics, steroids.  Nebulizers helping.  Overall improving.  Getting 2D echo. Still wheezing on exam, will continue steroids, nebs, Lasix.  Electronically signed by Pool Alford MD, 01/06/24, 12:58 PM EST.

## 2024-01-06 NOTE — PLAN OF CARE
Goal Outcome Evaluation:           Progress: no change  Outcome Evaluation: No acute changes throughout shift today, vss, patient up ad ayden to br, remians on 2L NC at this time, reports feeling like mucus is breaking up in chest and having an easier time breathing. hopeful to dc soon.

## 2024-01-07 LAB
ALBUMIN SERPL-MCNC: 3.6 G/DL (ref 3.5–5.2)
ANION GAP SERPL CALCULATED.3IONS-SCNC: 6.7 MMOL/L (ref 5–15)
BASOPHILS # BLD AUTO: 0.01 10*3/MM3 (ref 0–0.2)
BASOPHILS NFR BLD AUTO: 0.1 % (ref 0–1.5)
BUN SERPL-MCNC: 20 MG/DL (ref 8–23)
BUN/CREAT SERPL: 24.1 (ref 7–25)
CALCIUM SPEC-SCNC: 9.1 MG/DL (ref 8.6–10.5)
CHLORIDE SERPL-SCNC: 103 MMOL/L (ref 98–107)
CO2 SERPL-SCNC: 34.3 MMOL/L (ref 22–29)
CREAT SERPL-MCNC: 0.83 MG/DL (ref 0.57–1)
DEPRECATED RDW RBC AUTO: 51.6 FL (ref 37–54)
EGFRCR SERPLBLD CKD-EPI 2021: 78.3 ML/MIN/1.73
EOSINOPHIL # BLD AUTO: 0 10*3/MM3 (ref 0–0.4)
EOSINOPHIL NFR BLD AUTO: 0 % (ref 0.3–6.2)
ERYTHROCYTE [DISTWIDTH] IN BLOOD BY AUTOMATED COUNT: 15.3 % (ref 12.3–15.4)
GLUCOSE SERPL-MCNC: 92 MG/DL (ref 65–99)
HCT VFR BLD AUTO: 40.9 % (ref 34–46.6)
HGB BLD-MCNC: 12.6 G/DL (ref 12–15.9)
IMM GRANULOCYTES # BLD AUTO: 0.05 10*3/MM3 (ref 0–0.05)
IMM GRANULOCYTES NFR BLD AUTO: 0.5 % (ref 0–0.5)
LYMPHOCYTES # BLD AUTO: 2.18 10*3/MM3 (ref 0.7–3.1)
LYMPHOCYTES NFR BLD AUTO: 19.7 % (ref 19.6–45.3)
MAGNESIUM SERPL-MCNC: 1.9 MG/DL (ref 1.6–2.4)
MCH RBC QN AUTO: 28.4 PG (ref 26.6–33)
MCHC RBC AUTO-ENTMCNC: 30.8 G/DL (ref 31.5–35.7)
MCV RBC AUTO: 92.1 FL (ref 79–97)
MONOCYTES # BLD AUTO: 0.5 10*3/MM3 (ref 0.1–0.9)
MONOCYTES NFR BLD AUTO: 4.5 % (ref 5–12)
NEUTROPHILS NFR BLD AUTO: 75.2 % (ref 42.7–76)
NEUTROPHILS NFR BLD AUTO: 8.31 10*3/MM3 (ref 1.7–7)
NRBC BLD AUTO-RTO: 0 /100 WBC (ref 0–0.2)
PHOSPHATE SERPL-MCNC: 2.5 MG/DL (ref 2.5–4.5)
PLATELET # BLD AUTO: 158 10*3/MM3 (ref 140–450)
PMV BLD AUTO: 10.3 FL (ref 6–12)
POTASSIUM SERPL-SCNC: 4.4 MMOL/L (ref 3.5–5.2)
RBC # BLD AUTO: 4.44 10*6/MM3 (ref 3.77–5.28)
SODIUM SERPL-SCNC: 144 MMOL/L (ref 136–145)
WBC NRBC COR # BLD AUTO: 11.05 10*3/MM3 (ref 3.4–10.8)

## 2024-01-07 PROCEDURE — 83735 ASSAY OF MAGNESIUM: CPT | Performed by: PHYSICIAN ASSISTANT

## 2024-01-07 PROCEDURE — 94799 UNLISTED PULMONARY SVC/PX: CPT

## 2024-01-07 PROCEDURE — 25010000002 HEPARIN (PORCINE) PER 1000 UNITS: Performed by: STUDENT IN AN ORGANIZED HEALTH CARE EDUCATION/TRAINING PROGRAM

## 2024-01-07 PROCEDURE — 63710000001 REVEFENACIN 175 MCG/3ML SOLUTION: Performed by: STUDENT IN AN ORGANIZED HEALTH CARE EDUCATION/TRAINING PROGRAM

## 2024-01-07 PROCEDURE — 63710000001 PREDNISONE PER 1 MG: Performed by: PHYSICIAN ASSISTANT

## 2024-01-07 PROCEDURE — 80069 RENAL FUNCTION PANEL: CPT | Performed by: PHYSICIAN ASSISTANT

## 2024-01-07 PROCEDURE — 85025 COMPLETE CBC W/AUTO DIFF WBC: CPT | Performed by: PHYSICIAN ASSISTANT

## 2024-01-07 PROCEDURE — 94664 DEMO&/EVAL PT USE INHALER: CPT

## 2024-01-07 PROCEDURE — 99232 SBSQ HOSP IP/OBS MODERATE 35: CPT | Performed by: INTERNAL MEDICINE

## 2024-01-07 RX ADMIN — BUDESONIDE 0.5 MG: 0.5 INHALANT RESPIRATORY (INHALATION) at 06:37

## 2024-01-07 RX ADMIN — Medication 10 ML: at 22:02

## 2024-01-07 RX ADMIN — REVEFENACIN 175 MCG: 175 SOLUTION RESPIRATORY (INHALATION) at 06:37

## 2024-01-07 RX ADMIN — PREDNISONE 40 MG: 20 TABLET ORAL at 08:17

## 2024-01-07 RX ADMIN — SODIUM CHLORIDE SOLN NEBU 3% 4 ML: 3 NEBU SOLN at 18:45

## 2024-01-07 RX ADMIN — DOCUSATE SODIUM 50MG AND SENNOSIDES 8.6MG 2 TABLET: 8.6; 5 TABLET, FILM COATED ORAL at 22:01

## 2024-01-07 RX ADMIN — BUDESONIDE 0.5 MG: 0.5 INHALANT RESPIRATORY (INHALATION) at 18:45

## 2024-01-07 RX ADMIN — HEPARIN SODIUM 5000 UNITS: 5000 INJECTION INTRAVENOUS; SUBCUTANEOUS at 06:09

## 2024-01-07 RX ADMIN — DOXYCYCLINE 100 MG: 100 CAPSULE ORAL at 22:01

## 2024-01-07 RX ADMIN — DOCUSATE SODIUM 50MG AND SENNOSIDES 8.6MG 2 TABLET: 8.6; 5 TABLET, FILM COATED ORAL at 08:17

## 2024-01-07 RX ADMIN — PAROXETINE HYDROCHLORIDE 40 MG: 20 TABLET, FILM COATED ORAL at 08:17

## 2024-01-07 RX ADMIN — ATORVASTATIN CALCIUM 40 MG: 40 TABLET, FILM COATED ORAL at 08:17

## 2024-01-07 RX ADMIN — GUAIFENESIN 600 MG: 600 TABLET ORAL at 08:17

## 2024-01-07 RX ADMIN — SODIUM CHLORIDE SOLN NEBU 3% 4 ML: 3 NEBU SOLN at 06:35

## 2024-01-07 RX ADMIN — HEPARIN SODIUM 5000 UNITS: 5000 INJECTION INTRAVENOUS; SUBCUTANEOUS at 22:02

## 2024-01-07 RX ADMIN — HEPARIN SODIUM 5000 UNITS: 5000 INJECTION INTRAVENOUS; SUBCUTANEOUS at 15:43

## 2024-01-07 RX ADMIN — ARFORMOTEROL TARTRATE 15 MCG: 15 SOLUTION RESPIRATORY (INHALATION) at 18:45

## 2024-01-07 RX ADMIN — ARFORMOTEROL TARTRATE 15 MCG: 15 SOLUTION RESPIRATORY (INHALATION) at 06:37

## 2024-01-07 RX ADMIN — Medication 10 ML: at 08:17

## 2024-01-07 RX ADMIN — GUAIFENESIN 600 MG: 600 TABLET ORAL at 22:02

## 2024-01-07 RX ADMIN — AMLODIPINE BESYLATE 5 MG: 5 TABLET ORAL at 08:17

## 2024-01-07 RX ADMIN — METOPROLOL SUCCINATE 50 MG: 50 TABLET, EXTENDED RELEASE ORAL at 08:17

## 2024-01-07 RX ADMIN — DOXYCYCLINE 100 MG: 100 CAPSULE ORAL at 08:17

## 2024-01-07 RX ADMIN — ASPIRIN 81 MG: 81 TABLET, COATED ORAL at 08:17

## 2024-01-07 NOTE — PROGRESS NOTES
[facility]   Hospitalist Progress Note  Date: 2024  Patient Name: Linsey Manzanares  : 1960  MRN: [id number]  Date of admission: 2024      Subjective   Subjective     Chief Complaint: Shortness of air    Summary: Patient 63-year-old female past medical history significant for COPD, hypertension, hyperlipidemia, depression presents to the emergency department with shortness of air and altered mental status after taking a friend's methadone which she was given help her with her pain in the emergency department noted to have hypercapnic/hypoxic respiratory failure.  Patient does report having increasing shortness of air cough and wheezing over the past several days prior to admission after evaluation emergency department patient was admitted to hospital service for further medical evaluation and treatment.      Interval Followup: Feeling better, still with coughing, getting up some white phlegm      Objective   Objective     Vitals:   Temp:  [97.5 °F (36.4 °C)-98.6 °F (37 °C)] 97.7 °F (36.5 °C)  Heart Rate:  [59-69] 60  Resp:  [18-20] 18  BP: (154-177)/(77-86) 166/85  Flow (L/min):  [2-2.5] 2.5    Physical Exam   Constitutional: AAO sitting in bed  Respiratory: better air movement but still wheezing  Cardiovascular: RRR  GI: Abdomen soft nontender bowel sounds present  Extremities: Trace lower extremity edema noted    Result Review    Result Review:  I have reviewed results from the last 24 hours 2024    [x]  Laboratory  CBC          2024    04:48 2024    05:32 2024    03:01   CBC   WBC 6.70  11.43  11.05    RBC 4.40  4.23  4.44    Hemoglobin 12.5  11.9  12.6    Hematocrit 40.6  38.5  40.9    MCV 92.3  91.0  92.1    MCH 28.4  28.1  28.4    MCHC 30.8  30.9  30.8    RDW 14.6  15.0  15.3    Platelets 147  152  158      CMP          2024    04:48 2024    05:32 2024    03:01   CMP   Glucose 113  120  92    BUN 11  19  20    Creatinine 0.71  0.89  0.83    EGFR 94.5  72.1  78.3     Sodium 135  138  144    Potassium 4.8  4.2  4.4    Chloride 100  102  103    Calcium 8.7  8.9  9.1    Total Protein 6.4      Albumin 3.4  3.3  3.6    Globulin 3.0      Total Bilirubin 0.3      Alkaline Phosphatase 151      AST (SGOT) 42      ALT (SGPT) 41      Albumin/Globulin Ratio 1.1      BUN/Creatinine Ratio 15.5  21.3  24.1    Anion Gap 10.5  7.7  6.7        [x]  Microbiology  [x]  Radiology  No radiology results for the last day    []  EKG/Telemetry   []  Cardiology/Vascular   []  Pathology  []  Old records  []  Other:    Assessment & Plan   Assessment / Plan   Assessment:    Acute hypoxic/hypercapnic respiratory failure  Acute exacerbation chronic obstructive pulmonary disease  Elevated BNP  Altered mental status secondary to methadone resolved  Mild transaminitis  Lactic acidosis resolved  Hypertension  Hyperlipidemia  Depression    Plan:  Continue antibiotics  Continue with Lasix  2D echocardiogram reviewed - preserved ejection fraction diastolic dysfunction mild aortic stenosis  Continue nebulizer treatments  Added flutter valve Mucinex and saline nebs  Continue oral steroids with prednisone  Continue supplemental oxygen wean as tolerated will need 6-minute walk prior to discharge to assess for home oxygen needs  Suspect patient's hypercapnia as a result of COPD exacerbation plus methadone       Discussed care plan with RN 1/7/2024      DVT prophylaxis:  Medical DVT prophylaxis orders are present.    CODE STATUS:   Level Of Support Discussed With: Patient  Code Status (Patient has no pulse and is not breathing): CPR (Attempt to Resuscitate)  Medical Interventions (Patient has pulse or is breathing): Full Support      Electronically signed by Pool Alford MD, 01/07/24, 11:38 AM EST.

## 2024-01-07 NOTE — PLAN OF CARE
Goal Outcome Evaluation:           Progress: no change  Outcome Evaluation: No changes throughout shift today, on 2L nc, patient reports feeling much better, up ad ayden to br, vss, continuing plan of care.

## 2024-01-08 LAB
ALBUMIN SERPL-MCNC: 3.6 G/DL (ref 3.5–5.2)
ANION GAP SERPL CALCULATED.3IONS-SCNC: 6.6 MMOL/L (ref 5–15)
BUN SERPL-MCNC: 17 MG/DL (ref 8–23)
BUN/CREAT SERPL: 20.7 (ref 7–25)
CALCIUM SPEC-SCNC: 9.3 MG/DL (ref 8.6–10.5)
CHLORIDE SERPL-SCNC: 98 MMOL/L (ref 98–107)
CO2 SERPL-SCNC: 36.4 MMOL/L (ref 22–29)
CREAT SERPL-MCNC: 0.82 MG/DL (ref 0.57–1)
DEPRECATED RDW RBC AUTO: 49.7 FL (ref 37–54)
EGFRCR SERPLBLD CKD-EPI 2021: 79.5 ML/MIN/1.73
ERYTHROCYTE [DISTWIDTH] IN BLOOD BY AUTOMATED COUNT: 15 % (ref 12.3–15.4)
GLUCOSE BLDC GLUCOMTR-MCNC: 133 MG/DL (ref 70–99)
GLUCOSE SERPL-MCNC: 81 MG/DL (ref 65–99)
HCT VFR BLD AUTO: 41.4 % (ref 34–46.6)
HGB BLD-MCNC: 13.1 G/DL (ref 12–15.9)
MCH RBC QN AUTO: 28.7 PG (ref 26.6–33)
MCHC RBC AUTO-ENTMCNC: 31.6 G/DL (ref 31.5–35.7)
MCV RBC AUTO: 90.8 FL (ref 79–97)
PHOSPHATE SERPL-MCNC: 2.8 MG/DL (ref 2.5–4.5)
PLATELET # BLD AUTO: 166 10*3/MM3 (ref 140–450)
PMV BLD AUTO: 10 FL (ref 6–12)
POTASSIUM SERPL-SCNC: 4.5 MMOL/L (ref 3.5–5.2)
RBC # BLD AUTO: 4.56 10*6/MM3 (ref 3.77–5.28)
SODIUM SERPL-SCNC: 141 MMOL/L (ref 136–145)
WBC NRBC COR # BLD AUTO: 9.61 10*3/MM3 (ref 3.4–10.8)

## 2024-01-08 PROCEDURE — 82948 REAGENT STRIP/BLOOD GLUCOSE: CPT

## 2024-01-08 PROCEDURE — 99232 SBSQ HOSP IP/OBS MODERATE 35: CPT | Performed by: INTERNAL MEDICINE

## 2024-01-08 PROCEDURE — 63710000001 REVEFENACIN 175 MCG/3ML SOLUTION: Performed by: STUDENT IN AN ORGANIZED HEALTH CARE EDUCATION/TRAINING PROGRAM

## 2024-01-08 PROCEDURE — 94799 UNLISTED PULMONARY SVC/PX: CPT

## 2024-01-08 PROCEDURE — 25010000002 HEPARIN (PORCINE) PER 1000 UNITS: Performed by: STUDENT IN AN ORGANIZED HEALTH CARE EDUCATION/TRAINING PROGRAM

## 2024-01-08 PROCEDURE — 94760 N-INVAS EAR/PLS OXIMETRY 1: CPT

## 2024-01-08 PROCEDURE — 94618 PULMONARY STRESS TESTING: CPT

## 2024-01-08 PROCEDURE — 94664 DEMO&/EVAL PT USE INHALER: CPT

## 2024-01-08 PROCEDURE — 63710000001 PREDNISONE PER 1 MG: Performed by: PHYSICIAN ASSISTANT

## 2024-01-08 PROCEDURE — 85027 COMPLETE CBC AUTOMATED: CPT | Performed by: PHYSICIAN ASSISTANT

## 2024-01-08 PROCEDURE — 80069 RENAL FUNCTION PANEL: CPT | Performed by: PHYSICIAN ASSISTANT

## 2024-01-08 RX ORDER — LISINOPRIL 10 MG/1
10 TABLET ORAL DAILY
Status: DISCONTINUED | OUTPATIENT
Start: 2024-01-08 | End: 2024-01-09 | Stop reason: HOSPADM

## 2024-01-08 RX ORDER — AMLODIPINE BESYLATE 5 MG/1
10 TABLET ORAL DAILY
Status: DISCONTINUED | OUTPATIENT
Start: 2024-01-08 | End: 2024-01-09 | Stop reason: HOSPADM

## 2024-01-08 RX ADMIN — ATORVASTATIN CALCIUM 40 MG: 40 TABLET, FILM COATED ORAL at 08:36

## 2024-01-08 RX ADMIN — ARFORMOTEROL TARTRATE 15 MCG: 15 SOLUTION RESPIRATORY (INHALATION) at 07:14

## 2024-01-08 RX ADMIN — REVEFENACIN 175 MCG: 175 SOLUTION RESPIRATORY (INHALATION) at 07:14

## 2024-01-08 RX ADMIN — ASPIRIN 81 MG: 81 TABLET, COATED ORAL at 08:35

## 2024-01-08 RX ADMIN — DOCUSATE SODIUM 50MG AND SENNOSIDES 8.6MG 2 TABLET: 8.6; 5 TABLET, FILM COATED ORAL at 08:35

## 2024-01-08 RX ADMIN — LISINOPRIL 10 MG: 10 TABLET ORAL at 08:36

## 2024-01-08 RX ADMIN — BUDESONIDE 0.5 MG: 0.5 INHALANT RESPIRATORY (INHALATION) at 07:14

## 2024-01-08 RX ADMIN — HEPARIN SODIUM 5000 UNITS: 5000 INJECTION INTRAVENOUS; SUBCUTANEOUS at 14:35

## 2024-01-08 RX ADMIN — Medication 10 ML: at 08:37

## 2024-01-08 RX ADMIN — PREDNISONE 40 MG: 20 TABLET ORAL at 08:35

## 2024-01-08 RX ADMIN — BUDESONIDE 0.5 MG: 0.5 INHALANT RESPIRATORY (INHALATION) at 18:32

## 2024-01-08 RX ADMIN — METOPROLOL SUCCINATE 50 MG: 50 TABLET, EXTENDED RELEASE ORAL at 08:35

## 2024-01-08 RX ADMIN — SODIUM CHLORIDE SOLN NEBU 3% 4 ML: 3 NEBU SOLN at 18:32

## 2024-01-08 RX ADMIN — ARFORMOTEROL TARTRATE 15 MCG: 15 SOLUTION RESPIRATORY (INHALATION) at 18:32

## 2024-01-08 RX ADMIN — AMLODIPINE BESYLATE 10 MG: 5 TABLET ORAL at 08:35

## 2024-01-08 RX ADMIN — DOCUSATE SODIUM 50MG AND SENNOSIDES 8.6MG 2 TABLET: 8.6; 5 TABLET, FILM COATED ORAL at 21:02

## 2024-01-08 RX ADMIN — GUAIFENESIN 600 MG: 600 TABLET ORAL at 08:36

## 2024-01-08 RX ADMIN — DOXYCYCLINE 100 MG: 100 CAPSULE ORAL at 08:36

## 2024-01-08 RX ADMIN — DOXYCYCLINE 100 MG: 100 CAPSULE ORAL at 21:02

## 2024-01-08 RX ADMIN — Medication 10 ML: at 21:02

## 2024-01-08 RX ADMIN — SODIUM CHLORIDE SOLN NEBU 3% 4 ML: 3 NEBU SOLN at 07:14

## 2024-01-08 RX ADMIN — HEPARIN SODIUM 5000 UNITS: 5000 INJECTION INTRAVENOUS; SUBCUTANEOUS at 21:02

## 2024-01-08 RX ADMIN — PAROXETINE HYDROCHLORIDE 40 MG: 20 TABLET, FILM COATED ORAL at 08:35

## 2024-01-08 RX ADMIN — GUAIFENESIN 600 MG: 600 TABLET ORAL at 21:02

## 2024-01-08 NOTE — SIGNIFICANT NOTE
01/08/24 1323   Values/Beliefs   Values/Beliefs Comment Patient states she is a Rastafari   Behavior WDL   Behavior WDL WDL   Emotion Mood WDL   Emotion/Mood/Affect WDL WDL   Mental Health   Little Interest or Pleasure in Doing Things 2-->more than half the days   Feeling Down, Depressed or Hopeless 2-->more than half the days   Speech WDL   Speech WDL WDL   Perceptual State WDL   Perceptual State WDL WDL   Thought Process WDL   Thought Process WDL WDL   Intellectual Performance WDL   Intellectual Performance WDL WDL   Level of Consciousness Alert   Stress   Do you feel stress - tense, restless, nervous, or anxious, or unable to sleep at night because your mind is troubled all the time - these days? Not at all   Coping/Stress   Major Change/Loss/Stressor illness;death of a loved one   Sources of Support adult child(karin)   Reaction to Health Status depressed   Developmental Stage (Eriksson's)   Developmental Stage Stage 7 (35-65 years/Middle Adulthood) Generativity vs. Stagnation   C-SSRS (Recent)   Q1 Wished to be Dead (Past Month) no   Q2 Suicidal Thoughts (Past Month) no   Q6 Suicide Behavior (Lifetime) no   Violence Risk   Feels Like Hurting Others no   Previous Attempt to Harm Others no       SW met with patient at the bedside to complete psychosocial assessment, due to patient having a HX of depression, and having having a positive UDS for methadone without a prescription. Patient notified staff in the ER, that she took Methadone someone gave her, due to severe pain. Patient tells SW she lives at home with her daughter, and her son. Patient states she has a HX of depression. Patient tells SW that she never feels good, due to her COPD, and that she lost her  recently. Patient denies any SI or HI at this time. Patient also denies having any current feelings of anxiety and that her depression comes only sometimes. Patient tells SW that she saw a counselor years ago, but does not wish to have one at this  time. Patient states she has a good relationship with her family members. She denies having any issues with substance abuse or also alcohol abuse. Patient denies wanting any counseling resources. No needs identified.     Tamika Harkins MSW, CSW

## 2024-01-08 NOTE — PROCEDURES
Respiratory Therapist Walking Oximetry Progress Note      Patient Name:  Linsey Manzanares  YOB: 1960  Date of Procedure: 01/08/24              ROOM AIR BASELINE   SpO2%-  90   Heart Rate 85     EXERCISE ON ROOM AIR SpO2% EXERCISE ON O2 LPM SpO2%   1 MINUTE 82 1 MINUTE 2 85   2 MINUTES  2 MINUTES 2 90   3 MINUTES  3 MINUTES 2 90   4 MINUTES  4 MINUTES 2 92   5 MINUTES  5 MINUTES 2 93   6 MINUTES  6 MINUTES 2 92              SpO2% Post Exercise  93   HR Post Exercise  90   Time to Recovery  2 -3 minutes     Comments:  Patient walked from her bed, to the bathroom,and back, her SpO2 dropped to 82% on room air.  Patient had audible wheezes and labored breathing.  Patient placed on oxygen and recovered after a few minutes.  Patient states that she only walks around inside her house because of her dyspnea.          Electronically signed by Mayuri Parker RRT, 01/08/24, 2:38 PM EST.

## 2024-01-08 NOTE — PLAN OF CARE
Goal Outcome Evaluation:   VSS. Alert and oriented. Lungs with scattered wheezes noted, productive cough with tan to yellow colored sputum noted. Plan for patient to discharge tomorrow. Family at bedside.

## 2024-01-08 NOTE — PROGRESS NOTES
[facility]   Hospitalist Progress Note  Date: 2024  Patient Name: Linsey Manzanares  : 1960  MRN: [id number]  Date of admission: 2024    Subjective   Subjective     Chief Complaint: Shortness of air    Summary:   Linsey Manzanares is a 63 y.o. female with COPD, essential hypertension, hyperlipidemia and depression who presented to ED complaints of shortness of air and altered mental status after taking a friend's methadone.  Eval in ED significant for patient having hypercapnic/hypoxic respiratory failure.  Treatment for acute COPD exacerbation initiated.  Patient's respiratory status improved with nebulizer breathing treatments and steroid therapy.    Interval Followup:   No acute events overnight.  Patient respiratory status has been stable.  Still with cough and some shortness of breath especially with some exertion.  Blood pressure noted to be elevated.  Patient denying chest pain.  Nursing with no additional acute issues to report.    Antibiotics:   -Doxycycline    Objective   Objective     Vitals:   Temp:  [97.9 °F (36.6 °C)-98.1 °F (36.7 °C)] 97.9 °F (36.6 °C)  Heart Rate:  [56-71] 61  Resp:  [17-20] 18  BP: (133-187)/() 187/100  Flow (L/min):  [1-3] 1  Physical Exam   Gen: No acute distress, Conversant, Pleasant, sitting up in bed  Resp: CTAB, mild expiratory wheezing noted, No respiratory distress appreciated  Card: RRR, No m/r/g  Abd: Soft, Nontender, Nondistended, + bowel sounds    Result Review    Result Review:  I have personally reviewed the results as below and agree with these findings:  []  Laboratory:   CMP          2024    05:32 2024    03:01 2024    05:02   CMP   Glucose 120  92  81    BUN 19  20  17    Creatinine 0.89  0.83  0.82    EGFR 72.1  78.3  79.5    Sodium 138  144  141    Potassium 4.2  4.4  4.5    Chloride 102  103  98    Calcium 8.9  9.1  9.3    Albumin 3.3  3.6  3.6    BUN/Creatinine Ratio 21.3  24.1  20.7    Anion Gap 7.7  6.7  6.6      CBC           1/6/2024    05:32 1/7/2024    03:01 1/8/2024    05:02   CBC   WBC 11.43  11.05  9.61    RBC 4.23  4.44  4.56    Hemoglobin 11.9  12.6  13.1    Hematocrit 38.5  40.9  41.4    MCV 91.0  92.1  90.8    MCH 28.1  28.4  28.7    MCHC 30.9  30.8  31.6    RDW 15.0  15.3  15.0    Platelets 152  158  166    Phosphorus within normal limits.  [x]  Microbiology: Blood culture (01/04/2024): No growth to date  []  Radiology:   [x]  EKG/Telemetry:    []  Cardiology/Vascular:    []  Pathology:  []  Old records:  []  Other:    Assessment & Plan   Assessment / Plan     Assessment:  Acute hypoxic and hypercapnic respiratory failure  Acute COPD exacerbation  Acute on chronic diastolic heart failure  Altered mental status secondary to methadone, resolved  Mild transaminitis  Lactic acidosis, resolved, not clinically significant  Essential hypertension  Hyperlipidemia  Anxiety/depression    Plan:  -Continue doxycycline to complete 5 days of antibiotic treatment  -Continue Brovana and Pulmicort and Yupelri  -Continue prednisone 40 mg daily  -Increase amlodipine dose and start lisinopril given hypertension  -Continue metoprolol  -Walk test ordered to evaluate patient's need for home O2 at time of discharge  -Continue flutter valve  -Continue supplemental O2 to maintain sats greater than 90%, wean as tolerated  -Continue appropriate home medications  -Will monitor electrolytes and renal function with BMP and magnesium level in the AM  -Will monitor WBC and Hgb with CBC in the AM  -Clinical course will dictate further management     DVT Prophylaxis: Heparin  Diet: Cardiac  Dispo: Home when medically appropriate for discharge  Code Status: Full code     Personally reviewed patients labs and imaging, discussed with patient and nurse at bedside.      Part of this note may be an electronic transcription/translation of spoken language to printed text using the Dragon dictation system.    DVT prophylaxis:  Medical DVT prophylaxis orders are  present.    CODE STATUS:   Level Of Support Discussed With: Patient  Code Status (Patient has no pulse and is not breathing): CPR (Attempt to Resuscitate)  Medical Interventions (Patient has pulse or is breathing): Full Support        Electronically signed by Emeka Chan MD, 01/08/24, 3:30 PM EST.

## 2024-01-09 ENCOUNTER — READMISSION MANAGEMENT (OUTPATIENT)
Dept: CALL CENTER | Facility: HOSPITAL | Age: 64
End: 2024-01-09
Payer: MEDICARE

## 2024-01-09 VITALS
DIASTOLIC BLOOD PRESSURE: 78 MMHG | RESPIRATION RATE: 18 BRPM | SYSTOLIC BLOOD PRESSURE: 145 MMHG | HEART RATE: 55 BPM | TEMPERATURE: 98.2 F | WEIGHT: 210.32 LBS | OXYGEN SATURATION: 94 % | HEIGHT: 61 IN | BODY MASS INDEX: 39.71 KG/M2

## 2024-01-09 LAB
ANION GAP SERPL CALCULATED.3IONS-SCNC: 10.7 MMOL/L (ref 5–15)
BACTERIA SPEC AEROBE CULT: NORMAL
BACTERIA SPEC AEROBE CULT: NORMAL
BUN SERPL-MCNC: 19 MG/DL (ref 8–23)
BUN/CREAT SERPL: 23.5 (ref 7–25)
CALCIUM SPEC-SCNC: 9.3 MG/DL (ref 8.6–10.5)
CHLORIDE SERPL-SCNC: 95 MMOL/L (ref 98–107)
CO2 SERPL-SCNC: 34.3 MMOL/L (ref 22–29)
CREAT SERPL-MCNC: 0.81 MG/DL (ref 0.57–1)
DEPRECATED RDW RBC AUTO: 46.9 FL (ref 37–54)
EGFRCR SERPLBLD CKD-EPI 2021: 80.7 ML/MIN/1.73
ERYTHROCYTE [DISTWIDTH] IN BLOOD BY AUTOMATED COUNT: 14.4 % (ref 12.3–15.4)
GLUCOSE SERPL-MCNC: 74 MG/DL (ref 65–99)
HCT VFR BLD AUTO: 44.5 % (ref 34–46.6)
HGB BLD-MCNC: 14.2 G/DL (ref 12–15.9)
MAGNESIUM SERPL-MCNC: 1.8 MG/DL (ref 1.6–2.4)
MCH RBC QN AUTO: 28.3 PG (ref 26.6–33)
MCHC RBC AUTO-ENTMCNC: 31.9 G/DL (ref 31.5–35.7)
MCV RBC AUTO: 88.8 FL (ref 79–97)
PLATELET # BLD AUTO: 190 10*3/MM3 (ref 140–450)
PMV BLD AUTO: 9.9 FL (ref 6–12)
POTASSIUM SERPL-SCNC: 3.7 MMOL/L (ref 3.5–5.2)
RBC # BLD AUTO: 5.01 10*6/MM3 (ref 3.77–5.28)
SODIUM SERPL-SCNC: 140 MMOL/L (ref 136–145)
WBC NRBC COR # BLD AUTO: 10.59 10*3/MM3 (ref 3.4–10.8)

## 2024-01-09 PROCEDURE — 83735 ASSAY OF MAGNESIUM: CPT | Performed by: INTERNAL MEDICINE

## 2024-01-09 PROCEDURE — 94799 UNLISTED PULMONARY SVC/PX: CPT

## 2024-01-09 PROCEDURE — 63710000001 REVEFENACIN 175 MCG/3ML SOLUTION: Performed by: STUDENT IN AN ORGANIZED HEALTH CARE EDUCATION/TRAINING PROGRAM

## 2024-01-09 PROCEDURE — 25010000002 HEPARIN (PORCINE) PER 1000 UNITS: Performed by: STUDENT IN AN ORGANIZED HEALTH CARE EDUCATION/TRAINING PROGRAM

## 2024-01-09 PROCEDURE — 63710000001 PREDNISONE PER 1 MG: Performed by: PHYSICIAN ASSISTANT

## 2024-01-09 PROCEDURE — 85027 COMPLETE CBC AUTOMATED: CPT | Performed by: INTERNAL MEDICINE

## 2024-01-09 PROCEDURE — 99239 HOSP IP/OBS DSCHRG MGMT >30: CPT | Performed by: INTERNAL MEDICINE

## 2024-01-09 PROCEDURE — 94664 DEMO&/EVAL PT USE INHALER: CPT

## 2024-01-09 PROCEDURE — 80048 BASIC METABOLIC PNL TOTAL CA: CPT | Performed by: INTERNAL MEDICINE

## 2024-01-09 RX ORDER — PREDNISONE 20 MG/1
40 TABLET ORAL DAILY
Qty: 4 TABLET | Refills: 0 | Status: SHIPPED | OUTPATIENT
Start: 2024-01-09 | End: 2024-01-11

## 2024-01-09 RX ORDER — GUAIFENESIN 600 MG/1
600 TABLET, EXTENDED RELEASE ORAL EVERY 12 HOURS SCHEDULED
Qty: 28 TABLET | Refills: 0 | Status: SHIPPED | OUTPATIENT
Start: 2024-01-09 | End: 2024-01-23

## 2024-01-09 RX ORDER — DOXYCYCLINE 100 MG/1
100 CAPSULE ORAL EVERY 12 HOURS SCHEDULED
Qty: 2 CAPSULE | Refills: 0 | Status: SHIPPED | OUTPATIENT
Start: 2024-01-09 | End: 2024-01-10

## 2024-01-09 RX ORDER — AMLODIPINE BESYLATE 10 MG/1
10 TABLET ORAL DAILY
Qty: 30 TABLET | Refills: 0 | Status: SHIPPED | OUTPATIENT
Start: 2024-01-09 | End: 2024-02-08

## 2024-01-09 RX ADMIN — LISINOPRIL 10 MG: 10 TABLET ORAL at 08:12

## 2024-01-09 RX ADMIN — HEPARIN SODIUM 5000 UNITS: 5000 INJECTION INTRAVENOUS; SUBCUTANEOUS at 06:08

## 2024-01-09 RX ADMIN — ATORVASTATIN CALCIUM 40 MG: 40 TABLET, FILM COATED ORAL at 08:12

## 2024-01-09 RX ADMIN — DOCUSATE SODIUM 50MG AND SENNOSIDES 8.6MG 2 TABLET: 8.6; 5 TABLET, FILM COATED ORAL at 08:12

## 2024-01-09 RX ADMIN — DOXYCYCLINE 100 MG: 100 CAPSULE ORAL at 08:12

## 2024-01-09 RX ADMIN — BUDESONIDE 0.5 MG: 0.5 INHALANT RESPIRATORY (INHALATION) at 08:35

## 2024-01-09 RX ADMIN — METOPROLOL SUCCINATE 50 MG: 50 TABLET, EXTENDED RELEASE ORAL at 08:12

## 2024-01-09 RX ADMIN — PREDNISONE 40 MG: 20 TABLET ORAL at 08:12

## 2024-01-09 RX ADMIN — REVEFENACIN 175 MCG: 175 SOLUTION RESPIRATORY (INHALATION) at 08:35

## 2024-01-09 RX ADMIN — PAROXETINE HYDROCHLORIDE 40 MG: 20 TABLET, FILM COATED ORAL at 06:08

## 2024-01-09 RX ADMIN — ARFORMOTEROL TARTRATE 15 MCG: 15 SOLUTION RESPIRATORY (INHALATION) at 08:35

## 2024-01-09 RX ADMIN — GUAIFENESIN 600 MG: 600 TABLET ORAL at 08:12

## 2024-01-09 RX ADMIN — Medication 10 ML: at 08:12

## 2024-01-09 RX ADMIN — AMLODIPINE BESYLATE 10 MG: 5 TABLET ORAL at 08:12

## 2024-01-09 RX ADMIN — ASPIRIN 81 MG: 81 TABLET, COATED ORAL at 08:12

## 2024-01-09 NOTE — DISCHARGE SUMMARY
[facility]         HOSPITALIST  DISCHARGE SUMMARY    Patient Name: Linsey Manzanares  : 1960  MRN: [id number]    Date of Admission: 2024  Date of Discharge:  24  Primary Care Physician: Niranjan Law MD    Hospital Problems:  Acute hypoxic and hypercapnic respiratory failure  Acute COPD exacerbation  Acute on chronic diastolic heart failure  Altered mental status secondary to methadone, resolved  Mild transaminitis  Lactic acidosis, resolved, not clinically significant  Essential hypertension  Hyperlipidemia  Anxiety/depression    Hospital Course     Hospital Course:  Linsey Manzanares is a 63 y.o. female with COPD, essential hypertension, hyperlipidemia and depression who presented to ED complaints of shortness of air and altered mental status after taking a friend's methadone.  Eval in ED significant for patient having hypercapnic/hypoxic respiratory failure.  Treatment for acute COPD exacerbation initiated.  Patient's respiratory status improved with nebulizer breathing treatments and steroid therapy.  Patient will complete steroid therapy and antibiotic therapy after discharge.  Prior to discharge home patient had home walk test completed to evaluate need for home O2.  Results were personally reviewed and patient qualified for home O2 at time of discharge.  This was set up for the patient prior to patient discharging home.  Ambulatory referral made for outpatient pulmonology evaluation as well prior to discharge.  Given patient's medical comorbidities she is high risk for readmission to the hospital.  On day of discharge patient hemodynamically stable and no additional inpatient evaluation or workup necessary at this time, patient will discharge home with outpatient follow-up.    DISCHARGE Follow Up Recommendations for labs and diagnostics:   -Follow-up with PCP in 3 to 5 days  -Ambulatory referral made for outpatient pulmonology evaluation    Day of Discharge     Vital  Signs:  Temp:  [97.9 °F (36.6 °C)-98.8 °F (37.1 °C)] 98.8 °F (37.1 °C)  Heart Rate:  [56-65] 58  Resp:  [18] 18  BP: (144-167)/(69-85) 152/80  Flow (L/min):  [1-2] 2  Physical Exam:   Gen: No acute distress, sitting up on edge of bed, pleasant, conversant  Resp: CTAB, minimal expiratory wheezing, improved aeration, no increased work of breathing, equal chest rise bilaterally  Card: RRR, No m/r/g  Abd: Soft, Nontender, Nondistended, + bowel sounds     Discharge Details        Discharge Medications        New Medications        Instructions Start Date   doxycycline 100 MG capsule  Commonly known as: MONODOX   100 mg, Oral, Every 12 Hours Scheduled      guaiFENesin 600 MG 12 hr tablet  Commonly known as: MUCINEX   600 mg, Oral, Every 12 Hours Scheduled      predniSONE 20 MG tablet  Commonly known as: DELTASONE   40 mg, Oral, Daily             Changes to Medications        Instructions Start Date   amLODIPine 10 MG tablet  Commonly known as: NORVASC  What changed:   medication strength  how much to take   10 mg, Oral, Daily             Continue These Medications        Instructions Start Date   albuterol sulfate  (90 Base) MCG/ACT inhaler  Commonly known as: PROVENTIL HFA;VENTOLIN HFA;PROAIR HFA   2 puffs, Inhalation, Every 4 Hours PRN      aspirin 81 MG EC tablet   Take 1 tablet by mouth Daily.      Buprenorphine patch weekly transdermal patch  Commonly known as: BUTRANS   Place 1 patch on the skin as directed by provider Every 7 (Seven) Days.      Fluticasone Furoate-Vilanterol 200-25 MCG/ACT inhaler  Commonly known as: BREO ELLIPTA   1 puff, Inhalation, Daily      Lipitor 20 MG tablet  Generic drug: atorvastatin   Take 2 tablets by mouth 2 (Two) Times a Day.      losartan 100 MG tablet  Commonly known as: COZAAR   1 tablet, Oral, Daily      metoprolol succinate  MG 24 hr tablet  Commonly known as: TOPROL-XL   100 mg, Oral, Daily      ondansetron ODT 4 MG disintegrating tablet  Commonly known as:  ZOFRAN-ODT   4 mg, Translingual, Every 8 Hours PRN      PARoxetine 40 MG tablet  Commonly known as: PAXIL   40 mg, Oral, Every Morning      Spiriva HandiHaler 18 MCG per inhalation capsule  Generic drug: tiotropium   Place 1 capsule into inhaler and inhale Daily.      triamcinolone 0.5 % cream  Commonly known as: KENALOG   1 application , Topical, 2 Times Daily, Apply topically to the affected area twice daily               No Known Allergies    Discharge Disposition:  Home or Self Care    Diet:  Hospital:  Diet Order   Procedures    Diet: Cardiac Diets; Healthy Heart (2-3 Na+); Texture: Regular Texture (IDDSI 7); Fluid Consistency: Thin (IDDSI 0)       Discharge Activity:   Activity Instructions       Activity as Tolerated              CODE STATUS:  Code Status and Medical Interventions:   Ordered at: 01/05/24 0007     Level Of Support Discussed With:    Patient     Code Status (Patient has no pulse and is not breathing):    CPR (Attempt to Resuscitate)     Medical Interventions (Patient has pulse or is breathing):    Full Support       No future appointments.    Additional Instructions for the Follow-ups that You Need to Schedule       Discharge Follow-up with PCP   As directed       Currently Documented PCP:    Niranjan Law MD    PCP Phone Number:    [phone]     Follow Up Details: Follow-up in 3-5 days                Pertinent  and/or Most Recent Results     RADIOLOGY:  CT Chest With Contrast Diagnostic [[id number]] Talib as Reviewed   Order Status: Completed Collected: 01/05/24 0042    Updated: 01/05/24 0046   Narrative:     PROCEDURE: CT CHEST W CONTRAST DIAGNOSTIC     COMPARISONS: 1/04/2024, 17:47; 9/26/2023.     INDICATIONS: SHORTNESS OF BREATH; COUGH; NO CHEST COMPLAINTS (TODAY) IN THE [facility] CT DEPT.     TECHNIQUE: After obtaining the patient's consent, 928 CT/CTA images were obtained with non-ionic  intravenous contrast material.       PROTOCOL:   Pulmonary embolism CTA imaging protocol performed.      RADIATION:   Total DLP: 557.7 mGy*cm.    Automated exposure control was utilized to minimize radiation dose.  CONTRAST: 67 mL Isovue 370 I.V.  LABS:   eGFR: 63.4 mL/min/1.73m^2.     FINDINGS:  LUNGS: No acute infiltrate.  No suspicious pulmonary nodules.    VASCULATURE: No pulmonary embolism.  There is mild prominence of the pulmonary arteries (especially  the right pulmonary artery).  Pulmonary arterial hypertension cannot be excluded.  Coronary artery  calcifications are suggested.  KITA: No enlarged hilar lymph nodes.    MEDIASTINUM: No enlarged mediastinal lymph nodes.    CARDIAC: There may be mild cardiomegaly.  No pericardial effusion.  AORTA: No thoracic aortic aneurysm or dissection.  Atherosclerotic changes involve the  thoracoabdominal aorta.  PLEURA: No pleural effusion.  No pneumothorax.  CHEST WALL: No mass or axillary adenopathy.  No subcutaneous emphysema.  No focal fluid collection.     LIMITED ABDOMEN: No acute findings are seen in the partially imaged upper abdomen.  There is  diffuse hepatic steatosis with possible hepatomegaly.  The patient has undergone cholecystectomy.    Constipation is possible.  Diffuse pancreatic lipomatosis is incidentally noted.  BONES: No acute fracture.  No aggressive osseous lesion.  Degenerative changes are seen throughout  the imaged spine.  Ossification of the anterior longitudinal ligament is seen and may represent  diffuse idiopathic skeletal hyperostosis (DISH).    OTHER: The central tracheobronchial tree is well aerated without filling defect.  There is a  possible multinodular goiter with diffuse prominence of the thyroid gland.  There may be some  degree of inferior mediastinal extension of the thyroid gland, as seen on image 102 of series 403  and adjacent images.  Consider nonemergent thyroid ultrasound examination follow-up if clinically  warranted.         Impression:          1. No pulmonary embolism.       2. No acute infiltrate is seen.       3. There  is a suspected incidental multinodular goiter, as discussed.     4. Atherosclerotic changes are seen, including involvement the coronary arteries.       5. Mild cardiomegaly is suspected.       6. Please see above comments for further detail.          Please note that portions of this note were completed with a voice recognition program.     EUGENIE PATEL JR, MD        Electronically Signed and Approved By: EUGENIE PATEL JR, MD on 1/05/2024 at 0:42                      XR Chest 1 View [[id number]] Talib as Reviewed   Order Status: Completed Collected: 01/04/24 1805    Updated: 01/04/24 1808   Narrative:     PROCEDURE: XR CHEST 1 VW     COMPARISON: [facility], CR, XR CHEST 1 VW, 5/30/2023, 17:51.     INDICATIONS: Cough     FINDINGS:  Heart size and pulmonary vasculature are within normal limits.  No gross infiltrate is  demonstrated.  Costophrenic angles are sharp      Impression:     No radiographic findings of pneumonia                  RAMANA VALENCIA MD        Electronically Signed and Approved By: RAMANA VALENCIA MD on 1/04/2024 at 18:05         LAB RESULTS:      Lab 01/09/24  0515 01/08/24  0502 01/07/24  0301 01/06/24  0532 01/05/24  0448 01/04/24  2119 01/04/24  1815 01/04/24  1815 01/04/24  1652   WBC 10.59 9.61 11.05* 11.43* 6.70  --    < > 8.44  --    HEMOGLOBIN 14.2 13.1 12.6 11.9* 12.5  --    < > 13.0  --    HEMATOCRIT 44.5 41.4 40.9 38.5 40.6  --    < > 40.8  --    PLATELETS 190 166 158 152 147  --    < > 169  --    NEUTROS ABS  --   --  8.31*  --  5.68  --   --  7.00  --    IMMATURE GRANS (ABS)  --   --  0.05  --  0.04  --   --  0.04  --    LYMPHS ABS  --   --  2.18  --  0.91  --   --  0.92  --    MONOS ABS  --   --  0.50  --  0.05*  --   --  0.45  --    EOS ABS  --   --  0.00  --  0.00  --   --  0.01  --    MCV 88.8 90.8 92.1 91.0 92.3  --    < > 90.3  --    LACTATE  --   --   --   --   --  0.9  --   --  4.6*    < > = values in this interval not displayed.         Lab 01/09/24  0515  01/08/24  0502 01/07/24  0301 01/06/24  0532 01/05/24  0448   SODIUM 140 141 144 138 135*   POTASSIUM 3.7 4.5 4.4 4.2 4.8   CHLORIDE 95* 98 103 102 100   CO2 34.3* 36.4* 34.3* 28.3 24.5   ANION GAP 10.7 6.6 6.7 7.7 10.5   BUN 19 17 20 19 11   CREATININE 0.81 0.82 0.83 0.89 0.71   EGFR 80.7 79.5 78.3 72.1 94.5   GLUCOSE 74 81 92 120* 113*   CALCIUM 9.3 9.3 9.1 8.9 8.7   MAGNESIUM 1.8  --  1.9 1.9 1.9   PHOSPHORUS  --  2.8 2.5 2.3* 3.3   TSH  --   --   --  0.644  --          Lab 01/08/24  0502 01/07/24  0301 01/06/24  0532 01/05/24 0448 01/04/24  1652   TOTAL PROTEIN  --   --   --  6.4 6.6   ALBUMIN 3.6 3.6 3.3* 3.4* 3.7   GLOBULIN  --   --   --  3.0 2.9   ALT (SGPT)  --   --   --  41* 46*   AST (SGOT)  --   --   --  42* 52*   BILIRUBIN  --   --   --  0.3 0.3   ALK PHOS  --   --   --  151* 155*         Lab 01/05/24  0448   PROBNP 4,300.0*                 Lab 01/05/24  0126 01/04/24  2155   PH, ARTERIAL 7.332* 7.240*   PCO2, ARTERIAL 52.4* 66.7*   PO2 ART 93.5 90.5   O2 SATURATION ART 97.0 96.4   FIO2 30 32   HCO3 ART 27.1* 27.9*   BASE EXCESS ART 0.4 -1.0   CARBOXYHEMOGLOBIN 3.8* 5.0*     Brief Urine Lab Results  (Last result in the past 365 days)        Color   Clarity   Blood   Leuk Est   Nitrite   Protein   CREAT   Urine HCG        01/04/24 1654 Yellow   Clear   Moderate (2+)   Negative   Negative   30 mg/dL (1+)                 Microbiology Results (last 10 days)       Procedure Component Value - Date/Time    COVID-19, FLU A/B, RSV PCR 1 HR TAT - Swab, Nasopharynx [[id number]]  (Normal) Collected: 01/04/24 2133    Lab Status: Final result Specimen: Swab from Nasopharynx Updated: 01/04/24 2217     COVID19 Not Detected     Influenza A PCR Not Detected     Influenza B PCR Not Detected     RSV, PCR Not Detected    Narrative:      Fact sheet for providers: https://www.fda.gov/media/367645/download    Fact sheet for patients: https://www.fda.gov/media/582188/download    Test performed by PCR.    Legionella Antigen,  Urine - Urine, Urine, Clean Catch [[id number]]  (Normal) Collected: 01/04/24 1654    Lab Status: Final result Specimen: Urine, Clean Catch Updated: 01/05/24 0935     LEGIONELLA ANTIGEN, URINE Negative    S. Pneumo Ag Urine or CSF - Urine, Urine, Clean Catch [[id number]]  (Normal) Collected: 01/04/24 1654    Lab Status: Final result Specimen: Urine, Clean Catch Updated: 01/05/24 0935     Strep Pneumo Ag Negative    Blood Culture - Blood, Arm, Left [[id number]]  (Normal) Collected: 01/04/24 1652    Lab Status: Preliminary result Specimen: Blood from Arm, Left Updated: 01/08/24 1700     Blood Culture No growth at 4 days    Blood Culture - Blood, Arm, Right [[id number]]  (Normal) Collected: 01/04/24 1652    Lab Status: Preliminary result Specimen: Blood from Arm, Right Updated: 01/08/24 1700     Blood Culture No growth at 4 days    Mycoplasma Pneumoniae Antibody, IgM - Blood, [[id number]]  (Normal) Collected: 01/04/24 1652    Lab Status: Final result Specimen: Blood Updated: 01/05/24 0951     Mycoplasma pneumo IgM Negative            Results for orders placed during the hospital encounter of 01/04/24    Adult Transthoracic Echo Complete W/ Cont if Necessary Per Protocol    Interpretation Summary    Left ventricular systolic function is normal. Left ventricular ejection fraction appears to be 61 - 65%.    Left ventricular diastolic function is consistent with (grade I) impaired relaxation.    Aortic valve is mildly calcified.  Mild aortic valve stenosis is present.    Estimated right ventricular systolic pressure from tricuspid regurgitation is normal (<35 mmHg).      Labs Pending at Discharge:  Pending Labs       Order Current Status    Blood Culture - Blood, Arm, Left Preliminary result    Blood Culture - Blood, Arm, Right Preliminary result            Time spent on Discharge including face to face service:  31 minutes    Electronically signed by Emeka Chan MD, 01/09/24, 8:02 AM EST.

## 2024-01-09 NOTE — PLAN OF CARE
Goal Outcome Evaluation:   Patient discharging home via family.

## 2024-01-09 NOTE — PLAN OF CARE
Problem: Adult Inpatient Plan of Care  Goal: Plan of Care Review  Outcome: Ongoing, Progressing  Flowsheets  Taken 1/9/2024 0511 by Melissa Gray, RN  Progress: improving  Taken 1/5/2024 1711 by Destiny Staton, RN  Plan of Care Reviewed With: patient   Goal Outcome Evaluation:           Progress: improving          Uneventful shift. Pt on 2L NC. A&O x4.

## 2024-01-10 NOTE — OUTREACH NOTE
Prep Survey      Flowsheet Row Responses   [facility] facility patient discharged from? [place]   Is LACE score < 7 ? No   Eligibility Readm Mgmt   Discharge diagnosis Acute resp failure with hypoxia/COPD   Does the patient have one of the following disease processes/diagnoses(primary or secondary)? COPD   Does the patient have Home health ordered? No   Is there a DME ordered? No   Prep survey completed? Yes            IVY ESPARZA - Registered Nurse

## 2024-01-18 ENCOUNTER — READMISSION MANAGEMENT (OUTPATIENT)
Dept: CALL CENTER | Facility: HOSPITAL | Age: 64
End: 2024-01-18
Payer: MEDICARE

## 2024-01-18 NOTE — OUTREACH NOTE
COPD/PN Week 2 Survey      Flowsheet Row Responses   [facility] patient discharged from? [place]   Does the patient have one of the following disease processes/diagnoses(primary or secondary)? COPD   Week 2 attempt successful? Yes   Call start time 1221   Call end time 1224   Discharge diagnosis Acute resp failure with hypoxia/COPD   Person spoke with today (if not patient) and relationship pt   Meds reviewed with patient/caregiver? Yes   Is the patient having any side effects they believe may be caused by any medication additions or changes? No   Does the patient have all medications ordered at discharge? Yes   Is the patient taking all medications as directed (includes completed medication regime)? Yes   Does the patient have a primary care provider?  Yes   Does the patient have an appointment with their PCP or specialist within 7 days of discharge? Yes   Has the patient kept scheduled appointments due by today? Yes   Pulse Ox monitoring Intermittent   Pulse Ox device source Patient   O2 Sat: education provided Sat levels, When to seek care, Monitoring frequency   Psychosocial issues? No   Did the patient receive a copy of their discharge instructions? Yes   Nursing interventions Reviewed instructions with patient   What is the patient's perception of their health status since discharge? Improving   Nursing Interventions Nurse provided patient education   If the patient is a current smoker, are they able to teach back resources for cessation? Not a smoker   Is the patient/caregiver able to teach back the hierarchy of who to call/visit for symptoms/problems? PCP, Specialist, Home health nurse, Urgent Care, ED, 911 Yes   Is the patient able to teach back COPD zones? Yes   Nursing interventions Education provided on various zones   Patient reports what zone on this call? Green Zone   Green Zone Reports doing well, Breathing without shortness of breath, Usual activity and exercise level, Usual amounts of cough  and phlegm/mucous, Slept well last night, Appetite is good   Green Zone interventions: Take daily medications, Use oxygen as prescribed, Continue regular exercise/diet plan   Week 2 call completed? Yes   Is the patient interested in additional calls from an ambulatory ? No   Would this patient benefit from a Referral to [facility] Social Work? No   Wrap up additional comments pt stated she is doing better. on cont 2L O2 and sats running 93-95%   Call end time 1224            KATE HOFFMAN - Registered Nurse

## 2024-01-20 LAB
QT INTERVAL: 420 MS
QTC INTERVAL: 506 MS

## 2024-01-24 ENCOUNTER — READMISSION MANAGEMENT (OUTPATIENT)
Dept: CALL CENTER | Facility: HOSPITAL | Age: 64
End: 2024-01-24
Payer: MEDICARE

## 2024-01-24 NOTE — OUTREACH NOTE
COPD/PN Week 3 Survey      Flowsheet Row Responses   [facility] patient discharged from? [place]   Does the patient have one of the following disease processes/diagnoses(primary or secondary)? COPD   Week 3 attempt successful? Yes   Call start time 1319   Call end time 1321   Discharge diagnosis Acute resp failure with hypoxia/COPD   Meds reviewed with patient/caregiver? Yes   Is the patient taking all medications as directed (includes completed medication regime)? Yes   Does the patient have a primary care provider?  Yes   Comments regarding PCP PCP 1/29/24   Has the patient kept scheduled appointments due by today? N/A   Pulse Ox monitoring Intermittent   Pulse Ox device source Patient   O2 Sat comments pt sometimes wears up to 3L O2-pulse ox reads 92%   O2 Sat: education provided Sat levels, Monitoring frequency, When to seek care   What is the patient's perception of their health status since discharge? Improving   Week 3 call completed? Yes   Graduated Yes   Graduated/Revoked comments Pt states she is improving   Call end time 1321            Nakia YUEN - Registered Nurse

## 2024-05-03 ENCOUNTER — HOSPITAL ENCOUNTER (EMERGENCY)
Facility: HOSPITAL | Age: 64
Discharge: HOME | End: 2024-05-04
Attending: EMERGENCY MEDICINE
Payer: MEDICARE

## 2024-05-03 ENCOUNTER — APPOINTMENT (OUTPATIENT)
Dept: GENERAL RADIOLOGY | Facility: HOSPITAL | Age: 64
End: 2024-05-03
Payer: MEDICARE

## 2024-05-03 DIAGNOSIS — J44.9 CHRONIC OBSTRUCTIVE PULMONARY DISEASE, UNSPECIFIED COPD TYPE: ICD-10-CM

## 2024-05-03 DIAGNOSIS — R07.9 CHEST PAIN, UNSPECIFIED TYPE: Primary | ICD-10-CM

## 2024-05-03 LAB
ALBUMIN SERPL-MCNC: 3.8 G/DL (ref 3.5–5.2)
ALBUMIN/GLOB SERPL: 1.5 G/DL
ALP SERPL-CCNC: 97 U/L (ref 39–117)
ALT SERPL W P-5'-P-CCNC: 6 U/L (ref 1–33)
ANION GAP SERPL CALCULATED.3IONS-SCNC: 10.3 MMOL/L (ref 5–15)
AST SERPL-CCNC: 12 U/L (ref 1–32)
BASOPHILS # BLD AUTO: 0.08 10*3/MM3 (ref 0–0.2)
BASOPHILS NFR BLD AUTO: 1 % (ref 0–1.5)
BILIRUB SERPL-MCNC: 0.2 MG/DL (ref 0–1.2)
BUN SERPL-MCNC: 15 MG/DL (ref 8–23)
BUN/CREAT SERPL: 17.6 (ref 7–25)
CALCIUM SPEC-SCNC: 8.9 MG/DL (ref 8.6–10.5)
CHLORIDE SERPL-SCNC: 102 MMOL/L (ref 98–107)
CO2 SERPL-SCNC: 27.7 MMOL/L (ref 22–29)
CREAT SERPL-MCNC: 0.85 MG/DL (ref 0.57–1)
DEPRECATED RDW RBC AUTO: 43.6 FL (ref 37–54)
EGFRCR SERPLBLD CKD-EPI 2021: 75.7 ML/MIN/1.73
EOSINOPHIL # BLD AUTO: 0.25 10*3/MM3 (ref 0–0.4)
EOSINOPHIL NFR BLD AUTO: 3 % (ref 0.3–6.2)
ERYTHROCYTE [DISTWIDTH] IN BLOOD BY AUTOMATED COUNT: 13.3 % (ref 12.3–15.4)
GLOBULIN UR ELPH-MCNC: 2.6 GM/DL
GLUCOSE SERPL-MCNC: 109 MG/DL (ref 65–99)
HCT VFR BLD AUTO: 36.9 % (ref 34–46.6)
HGB BLD-MCNC: 12.1 G/DL (ref 12–15.9)
HOLD SPECIMEN: NORMAL
HOLD SPECIMEN: NORMAL
IMM GRANULOCYTES # BLD AUTO: 0.01 10*3/MM3 (ref 0–0.05)
IMM GRANULOCYTES NFR BLD AUTO: 0.1 % (ref 0–0.5)
LIPASE SERPL-CCNC: 11 U/L (ref 13–60)
LYMPHOCYTES # BLD AUTO: 3.54 10*3/MM3 (ref 0.7–3.1)
LYMPHOCYTES NFR BLD AUTO: 42.1 % (ref 19.6–45.3)
MAGNESIUM SERPL-MCNC: 1.9 MG/DL (ref 1.6–2.4)
MCH RBC QN AUTO: 29.4 PG (ref 26.6–33)
MCHC RBC AUTO-ENTMCNC: 32.8 G/DL (ref 31.5–35.7)
MCV RBC AUTO: 89.8 FL (ref 79–97)
MONOCYTES # BLD AUTO: 0.44 10*3/MM3 (ref 0.1–0.9)
MONOCYTES NFR BLD AUTO: 5.2 % (ref 5–12)
NEUTROPHILS NFR BLD AUTO: 4.09 10*3/MM3 (ref 1.7–7)
NEUTROPHILS NFR BLD AUTO: 48.6 % (ref 42.7–76)
NRBC BLD AUTO-RTO: 0 /100 WBC (ref 0–0.2)
NT-PROBNP SERPL-MCNC: 438.8 PG/ML (ref 0–900)
PLATELET # BLD AUTO: 203 10*3/MM3 (ref 140–450)
PMV BLD AUTO: 9.6 FL (ref 6–12)
POTASSIUM SERPL-SCNC: 3.5 MMOL/L (ref 3.5–5.2)
PROT SERPL-MCNC: 6.4 G/DL (ref 6–8.5)
RBC # BLD AUTO: 4.11 10*6/MM3 (ref 3.77–5.28)
SODIUM SERPL-SCNC: 140 MMOL/L (ref 136–145)
TROPONIN T SERPL HS-MCNC: 11 NG/L
WBC NRBC COR # BLD AUTO: 8.41 10*3/MM3 (ref 3.4–10.8)
WHOLE BLOOD HOLD COAG: NORMAL
WHOLE BLOOD HOLD SPECIMEN: NORMAL

## 2024-05-03 PROCEDURE — 83690 ASSAY OF LIPASE: CPT

## 2024-05-03 PROCEDURE — 93005 ELECTROCARDIOGRAM TRACING: CPT | Performed by: EMERGENCY MEDICINE

## 2024-05-03 PROCEDURE — 36415 COLL VENOUS BLD VENIPUNCTURE: CPT

## 2024-05-03 PROCEDURE — 99284 EMERGENCY DEPT VISIT MOD MDM: CPT

## 2024-05-03 PROCEDURE — 83735 ASSAY OF MAGNESIUM: CPT

## 2024-05-03 PROCEDURE — 80053 COMPREHEN METABOLIC PANEL: CPT

## 2024-05-03 PROCEDURE — 85025 COMPLETE CBC W/AUTO DIFF WBC: CPT | Performed by: EMERGENCY MEDICINE

## 2024-05-03 PROCEDURE — 71045 X-RAY EXAM CHEST 1 VIEW: CPT

## 2024-05-03 PROCEDURE — 84484 ASSAY OF TROPONIN QUANT: CPT

## 2024-05-03 PROCEDURE — 93005 ELECTROCARDIOGRAM TRACING: CPT

## 2024-05-03 PROCEDURE — 83880 ASSAY OF NATRIURETIC PEPTIDE: CPT

## 2024-05-03 RX ORDER — ASPIRIN 81 MG/1
324 TABLET, CHEWABLE ORAL ONCE
Status: DISCONTINUED | OUTPATIENT
Start: 2024-05-03 | End: 2024-05-04 | Stop reason: HOSPADM

## 2024-05-03 RX ORDER — SODIUM CHLORIDE 0.9 % (FLUSH) 0.9 %
10 SYRINGE (ML) INJECTION AS NEEDED
Status: DISCONTINUED | OUTPATIENT
Start: 2024-05-03 | End: 2024-05-04 | Stop reason: HOSPADM

## 2024-05-04 VITALS
TEMPERATURE: 99.8 F | OXYGEN SATURATION: 98 % | HEIGHT: 61 IN | RESPIRATION RATE: 16 BRPM | DIASTOLIC BLOOD PRESSURE: 64 MMHG | WEIGHT: 207.67 LBS | HEART RATE: 61 BPM | BODY MASS INDEX: 39.21 KG/M2 | SYSTOLIC BLOOD PRESSURE: 123 MMHG

## 2024-05-04 LAB
GEN 5 2HR TROPONIN T REFLEX: 9 NG/L
TROPONIN T DELTA: -2 NG/L

## 2024-05-04 PROCEDURE — 94640 AIRWAY INHALATION TREATMENT: CPT

## 2024-05-04 PROCEDURE — 94799 UNLISTED PULMONARY SVC/PX: CPT

## 2024-05-04 PROCEDURE — 84484 ASSAY OF TROPONIN QUANT: CPT | Performed by: EMERGENCY MEDICINE

## 2024-05-04 RX ORDER — IPRATROPIUM BROMIDE AND ALBUTEROL SULFATE 2.5; .5 MG/3ML; MG/3ML
3 SOLUTION RESPIRATORY (INHALATION) ONCE
Status: COMPLETED | OUTPATIENT
Start: 2024-05-04 | End: 2024-05-04

## 2024-05-04 RX ADMIN — IPRATROPIUM BROMIDE AND ALBUTEROL SULFATE 3 ML: .5; 3 SOLUTION RESPIRATORY (INHALATION) at 00:52

## 2024-05-04 NOTE — ED PROVIDER NOTES
Time: 10:59 PM EDT  Date of encounter:  5/3/2024  Independent Historian/Clinical History and Information was obtained by:   Patient  Chief Complaint: Chest discomfort    History is limited by: N/A    History of Present Illness:  Patient is a 64 y.o. year old female who presents to the emergency department for evaluation of chest discomfort.  Patient notes that she began having chest discomfort around 7:30 PM with minimal exertion while walking in the house.  She localizes the chest discomfort to the middle of the chest and states it did radiate to the left shoulder and down the arm.  States it was tight in nature.  She had some associated nausea no vomiting.  Patient also notes being somewhat fatigued.  The patient has shortness of breath chronically and that was unchanged.  She does have chronic respiratory failure and wears oxygen at home.  Denies any diaphoresis.  She has had no near-syncope or syncope.  She does note that she took one of her nitro at home and it seemed to help.  She called EMS who gave her 2 more nitro and she has been pain-free since that time.  She denies any pain in the neck or jaw.  Patient denies any abdominal pain.  The patient's had no pain or swelling in the legs.  Patient does have a history of hypertension, cholesterol and smoking.  She quit smoking 2 years ago.  States that she did have a myocardial infarction in 2013.  However she states that she did not receive stenting or bypass.  She states her last heart catheterization was several years ago.  No history of aortic aneurysm or dissection.  She has no history of DVT or pulmonary embolism.  She has had no recent immobilization, travel or surgery in the last 6 weeks.    HPI    Patient Care Team  Primary Care Provider: Niranjan Law MD    Past Medical History:     No Known Allergies  Past Medical History:   Diagnosis Date    COPD (chronic obstructive pulmonary disease)     Depression     Hypertension     Pneumonia     Sciatic leg  pain     left leg     Past Surgical History:   Procedure Laterality Date    CHOLECYSTECTOMY      HERNIA REPAIR      TUBAL ABDOMINAL LIGATION       History reviewed. No pertinent family history.    Home Medications:  Prior to Admission medications    Medication Sig Start Date End Date Taking? Authorizing Provider   albuterol sulfate  (90 Base) MCG/ACT inhaler Inhale 2 puffs Every 4 (Four) Hours As Needed. 5/2/23   Hussein Jones MD   aspirin 81 MG EC tablet Take 1 tablet by mouth Daily.    Hussein Jones MD   atorvastatin (Lipitor) 20 MG tablet Take 2 tablets by mouth 2 (Two) Times a Day.    Hussein Jones MD   Buprenorphine (BUTRANS) patch weekly transdermal patch Place 1 patch on the skin as directed by provider Every 7 (Seven) Days. 5/25/23   Hussein Jones MD   Fluticasone Furoate-Vilanterol (BREO ELLIPTA) 200-25 MCG/ACT inhaler Inhale 1 puff Daily. 5/3/23   Hussein Jones MD   losartan (COZAAR) 100 MG tablet Take 1 tablet by mouth Daily. 4/23/23   Hussein Jones MD   metoprolol succinate XL (TOPROL-XL) 100 MG 24 hr tablet Take 1 tablet by mouth Daily. 3/22/22   Hussein Jones MD   ondansetron ODT (ZOFRAN-ODT) 4 MG disintegrating tablet Place 1 tablet on the tongue Every 8 (Eight) Hours As Needed for Nausea or Vomiting. 8/31/21   Inderjit Harrington MD   PARoxetine (PAXIL) 40 MG tablet Take 1 tablet by mouth Every Morning. 4/23/23   Hussein Jones MD   Spiriva HandiHaler 18 MCG per inhalation capsule Place 1 capsule into inhaler and inhale Daily. 5/24/23   Hussein Jones MD   triamcinolone (KENALOG) 0.5 % cream Apply 1 application  topically to the appropriate area as directed 2 (Two) Times a Day. Apply topically to the affected area twice daily 5/2/23   Hussein Jones MD        Social History:   Social History     Tobacco Use    Smoking status: Former     Current packs/day: 0.00     Types: Cigarettes     Start date: 1983     Quit date:  "2013     Years since quittin.3   Vaping Use    Vaping status: Never Used   Substance Use Topics    Alcohol use: Never    Drug use: Never         Review of Systems:  Review of Systems   Constitutional:  Positive for fatigue. Negative for chills, diaphoresis and fever.   HENT:  Negative for congestion, postnasal drip, rhinorrhea and sore throat.    Eyes:  Negative for photophobia.   Respiratory:  Positive for cough, chest tightness, shortness of breath and wheezing.    Cardiovascular:  Positive for chest pain. Negative for palpitations and leg swelling.   Gastrointestinal:  Positive for nausea. Negative for abdominal pain, diarrhea and vomiting.   Genitourinary:  Negative for difficulty urinating, dysuria, flank pain, frequency, hematuria and urgency.   Musculoskeletal:  Positive for myalgias. Negative for neck pain and neck stiffness.   Skin:  Negative for pallor and rash.   Neurological:  Negative for dizziness, syncope, weakness, numbness and headaches.   Hematological:  Negative for adenopathy. Does not bruise/bleed easily.   Psychiatric/Behavioral: Negative.          Physical Exam:  /57   Pulse 57   Temp 99.8 °F (37.7 °C) (Oral)   Resp 16   Ht 154.9 cm (61\")   Wt 94.2 kg (207 lb 10.8 oz)   SpO2 96%   BMI 39.24 kg/m²     Physical Exam  Vitals and nursing note reviewed.   Constitutional:       General: She is not in acute distress.     Appearance: Normal appearance. She is not ill-appearing, toxic-appearing or diaphoretic.   HENT:      Head: Normocephalic and atraumatic.      Mouth/Throat:      Mouth: Mucous membranes are moist.   Eyes:      Pupils: Pupils are equal, round, and reactive to light.   Cardiovascular:      Rate and Rhythm: Normal rate and regular rhythm.      Pulses: Normal pulses.           Carotid pulses are 2+ on the right side and 2+ on the left side.       Radial pulses are 2+ on the right side and 2+ on the left side.        Femoral pulses are 2+ on the right side and 2+ on the " left side.       Popliteal pulses are 2+ on the right side and 2+ on the left side.        Dorsalis pedis pulses are 2+ on the right side and 2+ on the left side.        Posterior tibial pulses are 2+ on the right side and 2+ on the left side.      Heart sounds: Normal heart sounds. No murmur heard.  Pulmonary:      Effort: Pulmonary effort is normal. No accessory muscle usage, respiratory distress or retractions.      Breath sounds: Examination of the right-upper field reveals decreased breath sounds and wheezing. Examination of the left-upper field reveals decreased breath sounds and wheezing. Examination of the right-middle field reveals decreased breath sounds and wheezing. Examination of the left-middle field reveals decreased breath sounds and wheezing. Examination of the right-lower field reveals decreased breath sounds and wheezing. Examination of the left-lower field reveals decreased breath sounds and wheezing. Decreased breath sounds and wheezing present. No rhonchi or rales.   Chest:      Chest wall: No mass or tenderness.   Abdominal:      General: Abdomen is flat. There is no distension.      Palpations: Abdomen is soft. There is no mass or pulsatile mass.      Tenderness: There is no abdominal tenderness. There is no right CVA tenderness, left CVA tenderness, guarding or rebound.      Comments: No rigidity   Musculoskeletal:         General: No swelling, tenderness or deformity.      Cervical back: Neck supple. No tenderness.      Right lower leg: No tenderness. No edema.      Left lower leg: No tenderness. No edema.   Skin:     General: Skin is warm and dry.      Capillary Refill: Capillary refill takes less than 2 seconds.      Coloration: Skin is not jaundiced or pale.      Findings: No erythema.   Neurological:      General: No focal deficit present.      Mental Status: She is alert and oriented to person, place, and time. Mental status is at baseline.      Cranial Nerves: Cranial nerves 2-12 are  intact. No cranial nerve deficit.      Sensory: Sensation is intact. No sensory deficit.      Motor: Motor function is intact. No weakness or pronator drift.      Coordination: Coordination is intact. Coordination normal.   Psychiatric:         Mood and Affect: Mood normal.         Behavior: Behavior normal.                  Procedures:  Procedures      Medical Decision Making:      Comorbidities that affect care:    COPD, hypertension, depression, chronic respiratory failure    External Notes reviewed:    None      The following orders were placed and all results were independently analyzed by me:  Orders Placed This Encounter   Procedures    XR Chest 1 View    [place] Draw    High Sensitivity Troponin T    Comprehensive Metabolic Panel    Lipase    BNP    Magnesium    CBC Auto Differential    High Sensitivity Troponin T 2Hr    NPO Diet NPO Type: Strict NPO    Undress & Gown    Continuous Pulse Oximetry    Oxygen Therapy- Nasal Cannula; Titrate 1-6 LPM Per SpO2; 90 - 95%    ECG 12 Lead ED Triage Standing Order; Chest Pain    ECG 12 Lead ED Triage Standing Order; Chest Pain    Insert Peripheral IV    CBC & Differential    Green Top (Gel)    Lavender Top    Gold Top - SST    Light Blue Top       Medications Given in the Emergency Department:  Medications   sodium chloride 0.9 % flush 10 mL (has no administration in time range)   aspirin chewable tablet 324 mg (324 mg Oral Not Given 5/3/24 2110)   ipratropium-albuterol (DUO-NEB) nebulizer solution 3 mL (3 mL Nebulization Given 5/4/24 0052)        ED Course:    ED Course as of 05/04/24 0318   Fri May 03, 2024   2108 EKG:    Rhythm: Sinus rhythm  Rate: 59  Intervals: Normal HI and QT interval  T-wave: Nonspecific T wave flattening  ST Segment: Slight J-point elevation II, III, aVF, no pathological ST elevation or reciprocal ST depression to suggest STEMI    EKG Comparison: No change in the QRS and ST morphology from EKG performed January 4, 2024    Interpreted by me    [SD]   2351 EKG:    Rhythm: Sinus bradycardia  Rate: 56  Intervals: Normal ID and QT interval  T-wave: Nonspecific T wave flattening  ST Segment: Slight J-point elevation II, III, aVF, no obvious pathological ST elevation and reciprocal ST depression to suggest STEMI  EKG Comparison: No change in the QRS and ST morphology from EKG was performed earlier in the department    Interpreted by me   [SD]      ED Course User Index  [SD] Martín Huang DO       Labs:    Lab Results (last 24 hours)       Procedure Component Value Units Date/Time    High Sensitivity Troponin T [[id number]]  (Normal) Collected: 05/03/24 2116    Specimen: Blood Updated: 05/03/24 2147     HS Troponin T 11 ng/L     Narrative:      High Sensitive Troponin T Reference Range:  <14.0 ng/L- Negative Female for AMI  <22.0 ng/L- Negative Male for AMI  >=14 - Abnormal Female indicating possible myocardial injury.  >=22 - Abnormal Male indicating possible myocardial injury.   Clinicians would have to utilize clinical acumen, EKG, Troponin, and serial changes to determine if it is an Acute Myocardial Infarction or myocardial injury due to an underlying chronic condition.         CBC & Differential [[id number]]  (Abnormal) Collected: 05/03/24 2116    Specimen: Blood Updated: 05/03/24 2122    Narrative:      The following orders were created for panel order CBC & Differential.  Procedure                               Abnormality         Status                     ---------                               -----------         ------                     CBC Auto Differential[[id number]]        Abnormal            Final result                 Please view results for these tests on the individual orders.    Comprehensive Metabolic Panel [[id number]]  (Abnormal) Collected: 05/03/24 2116    Specimen: Blood Updated: 05/03/24 2143     Glucose 109 mg/dL      BUN 15 mg/dL      Creatinine 0.85 mg/dL      Sodium 140 mmol/L      Potassium 3.5 mmol/L      Chloride 102 mmol/L       CO2 27.7 mmol/L      Calcium 8.9 mg/dL      Total Protein 6.4 g/dL      Albumin 3.8 g/dL      ALT (SGPT) 6 U/L      AST (SGOT) 12 U/L      Alkaline Phosphatase 97 U/L      Total Bilirubin 0.2 mg/dL      Globulin 2.6 gm/dL      A/G Ratio 1.5 g/dL      BUN/Creatinine Ratio 17.6     Anion Gap 10.3 mmol/L      eGFR 75.7 mL/min/1.73     Narrative:      GFR Normal >60  Chronic Kidney Disease <60  Kidney Failure <15      Lipase [[id number]]  (Abnormal) Collected: 05/03/24 2116    Specimen: Blood Updated: 05/03/24 2143     Lipase 11 U/L     BNP [[id number]]  (Normal) Collected: 05/03/24 2116    Specimen: Blood Updated: 05/03/24 2147     proBNP 438.8 pg/mL     Narrative:      This assay is used as an aid in the diagnosis of individuals suspected of having heart failure. It can be used as an aid in the diagnosis of acute decompensated heart failure (ADHF) in patients presenting with signs and symptoms of ADHF to the emergency department (ED). In addition, NT-proBNP of <300 pg/mL indicates ADHF is not likely.    Age Range Result Interpretation  NT-proBNP Concentration (pg/mL:      <50             Positive            >450                   Gray                 300-450                    Negative             <300    50-75           Positive            >900                  Gray                300-900                  Negative            <300      >75             Positive            >1800                  Gray                300-1800                  Negative            <300    Magnesium [[id number]]  (Normal) Collected: 05/03/24 2116    Specimen: Blood Updated: 05/03/24 2143     Magnesium 1.9 mg/dL     CBC Auto Differential [[id number]]  (Abnormal) Collected: 05/03/24 2116    Specimen: Blood Updated: 05/03/24 2122     WBC 8.41 10*3/mm3      RBC 4.11 10*6/mm3      Hemoglobin 12.1 g/dL      Hematocrit 36.9 %      MCV 89.8 fL      MCH 29.4 pg      MCHC 32.8 g/dL      RDW 13.3 %      RDW-SD 43.6 fl      MPV 9.6 fL      Platelets 203  10*3/mm3      Neutrophil % 48.6 %      Lymphocyte % 42.1 %      Monocyte % 5.2 %      Eosinophil % 3.0 %      Basophil % 1.0 %      Immature Grans % 0.1 %      Neutrophils, Absolute 4.09 10*3/mm3      Lymphocytes, Absolute 3.54 10*3/mm3      Monocytes, Absolute 0.44 10*3/mm3      Eosinophils, Absolute 0.25 10*3/mm3      Basophils, Absolute 0.08 10*3/mm3      Immature Grans, Absolute 0.01 10*3/mm3      nRBC 0.0 /100 WBC     High Sensitivity Troponin T 2Hr [[id number]]  (Normal) Collected: 05/04/24 0005    Specimen: Blood Updated: 05/04/24 0028     HS Troponin T 9 ng/L      Troponin T Delta -2 ng/L     Narrative:      High Sensitive Troponin T Reference Range:  <14.0 ng/L- Negative Female for AMI  <22.0 ng/L- Negative Male for AMI  >=14 - Abnormal Female indicating possible myocardial injury.  >=22 - Abnormal Male indicating possible myocardial injury.   Clinicians would have to utilize clinical acumen, EKG, Troponin, and serial changes to determine if it is an Acute Myocardial Infarction or myocardial injury due to an underlying chronic condition.                  Imaging:    XR Chest 1 View    Result Date: 5/3/2024  XR CHEST 1 VW-  Date of Exam: 5/3/2024 9:15 PM  Indication: Chest Pain Triage Protocol  Comparison: Chest radiographs dating back to 1/28/2020.  FINDINGS:  The lungs are well-expanded. The heart and pulmonary vasculature are within normal limits. No pleural effusions are identified. There are no active appearing infiltrates.      No active disease.   Electronically Signed By-MAHNAZ CORONADO MD On:5/3/2024 9:32 PM         Differential Diagnosis and Discussion:    Chest Pain:  Based on the patient's signs and symptoms, I considered aortic dissection, myocardial infaction, pulmonary embolism, cardiac tamponade, pericarditis, pneumothorax, musculoskeletal chest pain and other differential diagnosis as an etiology of the patient's chest pain.     All labs were reviewed and interpreted by me.  All X-rays  impressions were independently interpreted by me.  EKG was interpreted by me.    MDM  Number of Diagnoses or Management Options  Chest pain, unspecified type  Chronic obstructive pulmonary disease, unspecified COPD type  Diagnosis management comments: The patient vital signs were stable in emergency room.    The patient's CBC was reviewed and shows no abnormalities of critical concern.  Of note, there is no anemia requiring a blood transfusion and the platelet count is acceptable    The patient's CMP was reviewed and shows no abnormalities of critical concern.  Of note, the patient's sodium and potassium are acceptable.  The patient's liver enzymes are unremarkable.  The patient's renal function including creatinine is preserved.  The patient has a normal anion gap.    The patient had a normal proBNP.  This makes acute decompensated heart failure unlikely    The patient had 2 high sensitivity troponins that were within normal limits.  The patient had 2 EKGs that demonstrated no evidence of ST segment elevation MI or other injury pattern    HEART Score for Major Cardiac Events - MDCalc  Calculated on May 04 2024 12:56 AM  5 points -> Moderate Score (4-6 points) Risk of MACE of 12-16.6%.    The patient was completely chest pain-free the entire time in the emergency room.  I discussed the patient's 2 normal high-sensitivity troponins with her.  I have also discussed the patient's heart score with her.  I offered admission to the hospital due to the patient's previous history of coronary disease and moderate story.  The patient understands that she potentially could have moderate risk or 17% risk of a cardiovascular event over the next 6 weeks.  I offered admission to the hospital for further evaluation of the chest discomfort including urgent cardiac consultation.  Understanding the risk, the patient states that she wants to go home and follow-up with her cardiologist on an outpatient basis.  I have discussed possible  transfer to see her cardiologist tonight by ambulance.  The patient has the capacity make her own medical decision making.  The patient states that she wants to go home and to follow-up with her cardiologist on outpatient basis.  She will continue her aspirin.  She will return back to the emergency room if she changes her mind and would like to have urgent cardiac consultation for chest discomfort.  She discussed the need for stress echocardiogram or cardiac catheterization with a cardiologist.  She will also return back to emergency room should she have return of chest pain, chest pressure, increasing shortness of breath, unusual fatigue, unusual diaphoresis, nausea or vomiting or any other new symptoms that may concern her.  Again, I offered admission to the hospital.  She is being discharged at her request.       Amount and/or Complexity of Data Reviewed  Clinical lab tests: reviewed  Tests in the radiology section of CPT®: reviewed  Tests in the medicine section of CPT®: reviewed         Social Determinants of Health:    Patient is independent, reliable, and has access to care.       Disposition and Care Coordination:    Discharged: The patient is suitable and stable for discharge with no need for consideration of admission.    I have explained discharge medications and the need for follow up with the patient/caretakers. This was also printed in the discharge instructions. Patient was discharged with the following medications and follow up:      Medication List      No changes were made to your prescriptions during this visit.      Niranjan Law MD  [id number]  [facility] [id number] [phone]    On 5/6/2024  COPD, call for appointment    Niranjan Rubio MD  [address]  [facility] [id number] [phone]    Call   Chest pain       Final diagnoses:   Chest pain, unspecified type   Chronic obstructive pulmonary disease, unspecified COPD type        ED Disposition       ED  Disposition   Discharge    Condition   Stable    Comment   --               This medical record created using voice recognition software.             Martín Huang DO  05/04/24 0315

## 2024-05-04 NOTE — ED TRIAGE NOTES
Arrived via EMS, chest pain 10/10, took 1 nitro at home prior to EMS, CP then 5/10, has had 2 more nitro with EMS now at 0/10 pain    Patient reports it as left anterior chest pain that radiated to upper back and left arm numbness.  Report that she is not in pain at this time.     Meds given by EMS  324 mg ASA, 4 mg of zofran, 350 ml NS  18 L AC      MI 2013, COPD, home O2 2L   Daily Assessment

## 2024-05-04 NOTE — DISCHARGE INSTRUCTIONS
Please return to the emergency room immediately if you change your mind and want to have the chest discomfort further valuated in the hospital urgent cardiac consultation    Please follow-up with your cardiologist on Monday discuss possible need for cardiac stress test or heart catheterization    No strenuous activity until released by the cardiologist.      Please continue a daily baby aspirin.     Please return to the emergency room for return of chest pain, radiating chest pain, shortness of breath, near passing out, passing out, unusual fatigue, unusual sweating, nausea or vomiting or new or worrisome symptoms

## 2024-05-05 LAB
QT INTERVAL: 452 MS
QT INTERVAL: 466 MS
QTC INTERVAL: 449 MS
QTC INTERVAL: 452 MS